# Patient Record
Sex: MALE | Race: WHITE | NOT HISPANIC OR LATINO | Employment: FULL TIME | URBAN - METROPOLITAN AREA
[De-identification: names, ages, dates, MRNs, and addresses within clinical notes are randomized per-mention and may not be internally consistent; named-entity substitution may affect disease eponyms.]

---

## 2023-09-29 LAB
ALANINE AMINOTRANSFERASE (SGPT) (U/L) IN SER/PLAS: 13 U/L (ref 10–52)
ALBUMIN (G/DL) IN SER/PLAS: 4.5 G/DL (ref 3.4–5)
ALKALINE PHOSPHATASE (U/L) IN SER/PLAS: 69 U/L (ref 33–120)
ANION GAP IN SER/PLAS: 15 MMOL/L (ref 10–20)
ASPARTATE AMINOTRANSFERASE (SGOT) (U/L) IN SER/PLAS: 11 U/L (ref 9–39)
BASOPHILS (10*3/UL) IN BLOOD BY AUTOMATED COUNT: 0.05 X10E9/L (ref 0–0.1)
BASOPHILS/100 LEUKOCYTES IN BLOOD BY AUTOMATED COUNT: 0.6 % (ref 0–2)
BILIRUBIN TOTAL (MG/DL) IN SER/PLAS: 0.6 MG/DL (ref 0–1.2)
CALCIUM (MG/DL) IN SER/PLAS: 9.6 MG/DL (ref 8.6–10.6)
CARBON DIOXIDE, TOTAL (MMOL/L) IN SER/PLAS: 24 MMOL/L (ref 21–32)
CHLORIDE (MMOL/L) IN SER/PLAS: 106 MMOL/L (ref 98–107)
CHOLESTEROL (MG/DL) IN SER/PLAS: 180 MG/DL (ref 0–199)
CHOLESTEROL IN HDL (MG/DL) IN SER/PLAS: 35.6 MG/DL
CHOLESTEROL/HDL RATIO: 5.1
CREATININE (MG/DL) IN SER/PLAS: 0.77 MG/DL (ref 0.5–1.3)
EOSINOPHILS (10*3/UL) IN BLOOD BY AUTOMATED COUNT: 0.18 X10E9/L (ref 0–0.7)
EOSINOPHILS/100 LEUKOCYTES IN BLOOD BY AUTOMATED COUNT: 2.3 % (ref 0–6)
ERYTHROCYTE DISTRIBUTION WIDTH (RATIO) BY AUTOMATED COUNT: 12.8 % (ref 11.5–14.5)
ERYTHROCYTE MEAN CORPUSCULAR HEMOGLOBIN CONCENTRATION (G/DL) BY AUTOMATED: 34.5 G/DL (ref 32–36)
ERYTHROCYTE MEAN CORPUSCULAR VOLUME (FL) BY AUTOMATED COUNT: 91 FL (ref 80–100)
ERYTHROCYTES (10*6/UL) IN BLOOD BY AUTOMATED COUNT: 5.21 X10E12/L (ref 4.5–5.9)
GFR MALE: >90 ML/MIN/1.73M2
GLUCOSE (MG/DL) IN SER/PLAS: 81 MG/DL (ref 74–99)
HEMATOCRIT (%) IN BLOOD BY AUTOMATED COUNT: 47.5 % (ref 41–52)
HEMOGLOBIN (G/DL) IN BLOOD: 16.4 G/DL (ref 13.5–17.5)
IMMATURE GRANULOCYTES/100 LEUKOCYTES IN BLOOD BY AUTOMATED COUNT: 0.4 % (ref 0–0.9)
LDL: 120 MG/DL (ref 0–99)
LEUKOCYTES (10*3/UL) IN BLOOD BY AUTOMATED COUNT: 8 X10E9/L (ref 4.4–11.3)
LYMPHOCYTES (10*3/UL) IN BLOOD BY AUTOMATED COUNT: 2.1 X10E9/L (ref 1.2–4.8)
LYMPHOCYTES/100 LEUKOCYTES IN BLOOD BY AUTOMATED COUNT: 26.3 % (ref 13–44)
MONOCYTES (10*3/UL) IN BLOOD BY AUTOMATED COUNT: 0.57 X10E9/L (ref 0.1–1)
MONOCYTES/100 LEUKOCYTES IN BLOOD BY AUTOMATED COUNT: 7.2 % (ref 2–10)
NEUTROPHILS (10*3/UL) IN BLOOD BY AUTOMATED COUNT: 5.04 X10E9/L (ref 1.2–7.7)
NEUTROPHILS/100 LEUKOCYTES IN BLOOD BY AUTOMATED COUNT: 63.2 % (ref 40–80)
NRBC (PER 100 WBCS) BY AUTOMATED COUNT: 0 /100 WBC (ref 0–0)
PLATELETS (10*3/UL) IN BLOOD AUTOMATED COUNT: 279 X10E9/L (ref 150–450)
POTASSIUM (MMOL/L) IN SER/PLAS: 4.2 MMOL/L (ref 3.5–5.3)
PROSTATE SPECIFIC AG (NG/ML) IN SER/PLAS: 0.7 NG/ML (ref 0–4)
PROTEIN TOTAL: 7.2 G/DL (ref 6.4–8.2)
SODIUM (MMOL/L) IN SER/PLAS: 141 MMOL/L (ref 136–145)
THYROTROPIN (MIU/L) IN SER/PLAS BY DETECTION LIMIT <= 0.05 MIU/L: 0.68 MIU/L (ref 0.44–3.98)
THYROXINE (T4) FREE (NG/DL) IN SER/PLAS: 0.94 NG/DL (ref 0.78–1.48)
TRIGLYCERIDE (MG/DL) IN SER/PLAS: 121 MG/DL (ref 0–149)
UREA NITROGEN (MG/DL) IN SER/PLAS: 17 MG/DL (ref 6–23)
VLDL: 24 MG/DL (ref 0–40)

## 2023-11-01 DIAGNOSIS — E29.1 HYPOGONADISM IN MALE: ICD-10-CM

## 2023-11-01 DIAGNOSIS — I10 PRIMARY HYPERTENSION: Primary | ICD-10-CM

## 2023-11-06 DIAGNOSIS — I10 PRIMARY HYPERTENSION: ICD-10-CM

## 2023-11-06 DIAGNOSIS — E29.1 HYPOGONADISM IN MALE: ICD-10-CM

## 2023-11-06 RX ORDER — LISINOPRIL 20 MG/1
20 TABLET ORAL DAILY
Qty: 30 TABLET | Refills: 2 | Status: SHIPPED | OUTPATIENT
Start: 2023-11-06 | End: 2024-02-15 | Stop reason: SDUPTHER

## 2023-11-06 RX ORDER — LISINOPRIL 20 MG/1
20 TABLET ORAL DAILY
Qty: 30 TABLET | Refills: 5 | Status: SHIPPED | OUTPATIENT
Start: 2023-11-06 | End: 2023-12-14 | Stop reason: SDUPTHER

## 2023-11-13 ENCOUNTER — LAB (OUTPATIENT)
Dept: LAB | Facility: LAB | Age: 48
End: 2023-11-13
Payer: COMMERCIAL

## 2023-11-13 DIAGNOSIS — E29.1 HYPOGONADISM IN MALE: ICD-10-CM

## 2023-11-13 PROCEDURE — 84402 ASSAY OF FREE TESTOSTERONE: CPT

## 2023-11-13 PROCEDURE — 36415 COLL VENOUS BLD VENIPUNCTURE: CPT

## 2023-11-19 LAB
TESTOSTERONE FREE (CHAN): 68.5 PG/ML (ref 35–155)
TESTOSTERONE,TOTAL,LC-MS/MS: 303 NG/DL (ref 250–1100)

## 2023-12-14 ENCOUNTER — OFFICE VISIT (OUTPATIENT)
Dept: PRIMARY CARE | Facility: CLINIC | Age: 48
End: 2023-12-14
Payer: COMMERCIAL

## 2023-12-14 VITALS
DIASTOLIC BLOOD PRESSURE: 70 MMHG | HEART RATE: 72 BPM | WEIGHT: 225 LBS | SYSTOLIC BLOOD PRESSURE: 120 MMHG | HEIGHT: 71 IN | RESPIRATION RATE: 16 BRPM | BODY MASS INDEX: 31.5 KG/M2

## 2023-12-14 DIAGNOSIS — E29.1 HYPOGONADISM IN MALE: ICD-10-CM

## 2023-12-14 DIAGNOSIS — K21.9 GASTROESOPHAGEAL REFLUX DISEASE WITHOUT ESOPHAGITIS: ICD-10-CM

## 2023-12-14 DIAGNOSIS — I10 PRIMARY HYPERTENSION: Primary | ICD-10-CM

## 2023-12-14 PROBLEM — E66.9 OBESITY (BMI 30.0-34.9): Status: ACTIVE | Noted: 2023-12-14

## 2023-12-14 PROBLEM — D22.70 MELANOCYTIC NEVI OF UNSPECIFIED LOWER LIMB, INCLUDING HIP: Status: ACTIVE | Noted: 2019-08-08

## 2023-12-14 PROBLEM — L57.9 SKIN CHANGES DUE TO CHRONIC EXPOSURE TO NONIONIZING RADIATION, UNSPECIFIED: Status: ACTIVE | Noted: 2019-08-08

## 2023-12-14 PROBLEM — D22.60 MELANOCYTIC NEVI OF UNSPECIFIED UPPER LIMB, INCLUDING SHOULDER: Status: ACTIVE | Noted: 2019-08-08

## 2023-12-14 PROBLEM — D22.5 MELANOCYTIC NEVI OF TRUNK: Status: ACTIVE | Noted: 2019-08-08

## 2023-12-14 PROBLEM — L82.0 INFLAMED SEBORRHEIC KERATOSIS: Status: ACTIVE | Noted: 2019-08-08

## 2023-12-14 PROBLEM — R03.0 ELEVATED BP WITHOUT DIAGNOSIS OF HYPERTENSION: Status: ACTIVE | Noted: 2023-12-14

## 2023-12-14 PROBLEM — B02.29 POST HERPETIC NEURALGIA: Status: ACTIVE | Noted: 2023-12-14

## 2023-12-14 PROBLEM — L57.0 ACTINIC KERATOSIS: Status: ACTIVE | Noted: 2019-08-08

## 2023-12-14 PROBLEM — L82.1 OTHER SEBORRHEIC KERATOSIS: Status: ACTIVE | Noted: 2019-08-08

## 2023-12-14 PROBLEM — D22.39 MELANOCYTIC NEVI OF OTHER PARTS OF FACE: Status: ACTIVE | Noted: 2019-08-08

## 2023-12-14 PROBLEM — E66.811 OBESITY (BMI 30.0-34.9): Status: ACTIVE | Noted: 2023-12-14

## 2023-12-14 PROCEDURE — 3078F DIAST BP <80 MM HG: CPT | Performed by: INTERNAL MEDICINE

## 2023-12-14 PROCEDURE — 4004F PT TOBACCO SCREEN RCVD TLK: CPT | Performed by: INTERNAL MEDICINE

## 2023-12-14 PROCEDURE — 99213 OFFICE O/P EST LOW 20 MIN: CPT | Performed by: INTERNAL MEDICINE

## 2023-12-14 PROCEDURE — 3074F SYST BP LT 130 MM HG: CPT | Performed by: INTERNAL MEDICINE

## 2023-12-14 RX ORDER — OMEPRAZOLE 40 MG/1
40 CAPSULE, DELAYED RELEASE ORAL
COMMUNITY
Start: 2019-02-06 | End: 2024-01-26 | Stop reason: HOSPADM

## 2023-12-14 ASSESSMENT — ENCOUNTER SYMPTOMS
CONSTITUTIONAL NEGATIVE: 1
EYES NEGATIVE: 1
GASTROINTESTINAL NEGATIVE: 1
MUSCULOSKELETAL NEGATIVE: 1
NEUROLOGICAL NEGATIVE: 1
ALLERGIC/IMMUNOLOGIC NEGATIVE: 1
RESPIRATORY NEGATIVE: 1
ENDOCRINE NEGATIVE: 1
PSYCHIATRIC NEGATIVE: 1
HEMATOLOGIC/LYMPHATIC NEGATIVE: 1
CARDIOVASCULAR NEGATIVE: 1

## 2023-12-14 NOTE — ASSESSMENT & PLAN NOTE
HTN - hypertension well/controlled .Target BP < 130/80  achieved. Educate low salt diet and exercise with weight loss. Educate home self monitoring and diary keeping. Educate risks of elevate blood pressure and benefits of prompt treatment.  Refill lisinopril

## 2023-12-14 NOTE — PROGRESS NOTES
"Subjective   Patient ID: Mele Kruger is a 48 y.o. male who presents for Follow-up (2 month follow up).    HPI     Review of Systems   Constitutional: Negative.    HENT: Negative.     Eyes: Negative.    Respiratory: Negative.     Cardiovascular: Negative.    Gastrointestinal: Negative.    Endocrine: Negative.    Musculoskeletal: Negative.    Skin: Negative.    Allergic/Immunologic: Negative.    Neurological: Negative.    Hematological: Negative.    Psychiatric/Behavioral: Negative.     All other systems reviewed and are negative.      Objective   Ht 1.803 m (5' 11\")   Wt 102 kg (225 lb)   BMI 31.38 kg/m²   Blood pressure 120/70, pulse 72, resp. rate 16, height 1.803 m (5' 11\"), weight 102 kg (225 lb).   Physical Exam  Vitals and nursing note reviewed.   Constitutional:       Appearance: Normal appearance.   HENT:      Head: Normocephalic and atraumatic.      Right Ear: Tympanic membrane, ear canal and external ear normal.      Left Ear: Tympanic membrane, ear canal and external ear normal. There is no impacted cerumen.      Nose: Nose normal.      Mouth/Throat:      Mouth: Mucous membranes are moist.      Pharynx: Oropharynx is clear.   Eyes:      Extraocular Movements: Extraocular movements intact.      Conjunctiva/sclera: Conjunctivae normal.      Pupils: Pupils are equal, round, and reactive to light.   Cardiovascular:      Rate and Rhythm: Normal rate and regular rhythm.      Pulses: Normal pulses.      Heart sounds: Normal heart sounds. No murmur heard.  Pulmonary:      Effort: Pulmonary effort is normal. No respiratory distress.      Breath sounds: Normal breath sounds. No stridor. No wheezing, rhonchi or rales.   Chest:      Chest wall: No tenderness.   Abdominal:      General: Abdomen is flat. Bowel sounds are normal. There is no distension.      Palpations: Abdomen is soft. There is no mass.      Tenderness: There is no abdominal tenderness. There is no right CVA tenderness, left CVA tenderness, guarding or " rebound.      Hernia: No hernia is present.   Musculoskeletal:         General: Normal range of motion.      Cervical back: Normal range of motion and neck supple.   Skin:     General: Skin is warm.      Capillary Refill: Capillary refill takes less than 2 seconds.   Neurological:      General: No focal deficit present.      Mental Status: He is alert.      Cranial Nerves: No cranial nerve deficit.      Sensory: No sensory deficit.      Motor: No weakness.      Coordination: Coordination normal.      Gait: Gait normal.      Deep Tendon Reflexes: Reflexes normal.   Psychiatric:         Mood and Affect: Mood normal.         Behavior: Behavior normal. Behavior is cooperative.         Thought Content: Thought content normal.         Judgment: Judgment normal.         Assessment/Plan   Problem List Items Addressed This Visit             ICD-10-CM    Hypogonadism in male E29.1     Monitor levles         Primary hypertension - Primary I10     HTN - hypertension well/controlled .Target BP < 130/80  achieved. Educate low salt diet and exercise with weight loss. Educate home self monitoring and diary keeping. Educate risks of elevate blood pressure and benefits of prompt treatment.  Refill lisinopril         GERD (gastroesophageal reflux disease) K21.9     GERD - Acid reflux disease. Rx. PPI (Prilosec/Prevacid/Protonix/Nexium) and educate diet and life style changes. Referred patient to an endoscopy (EGD) and check H. Pylori titers.             HTN - hypertension well/controlled.Target BP < 130/80 well/not achieved. Educate low salt diet and exercise with weight loss. Educate home self monitoring and diary keeping. Educate risks of elevate blood pressure and benefits of prompt treatment..start Lisinopril/Hct 10/12.5 mg daily      Dizziness - monitor blood pressure      Fatigue - check CMP(metabolic panel and elctrolytes) , CBC(complete blood cell count), TSH(thyroid function). Insomnia may play a role and sleep studies(rule  out sleep apnea) are recommended. Educate sleep hygiene. Consider anxiety disorder vs. depression. Consider Stress test, and 2DECHO.      GERD - Continues the proton pump inhibitors in (PPI) in the form of /Pantoprazole 40 mg daily and educate change in life style and educate diet - Educate extensively diet and elevate the head of the bed at night - at HS = refer to GI for EGD - last upper endoscopy ??? refer back to GI for upper endoscopy -

## 2024-01-25 ENCOUNTER — APPOINTMENT (OUTPATIENT)
Dept: CARDIOLOGY | Facility: HOSPITAL | Age: 49
End: 2024-01-25
Payer: COMMERCIAL

## 2024-01-25 ENCOUNTER — APPOINTMENT (OUTPATIENT)
Dept: RADIOLOGY | Facility: HOSPITAL | Age: 49
End: 2024-01-25
Payer: COMMERCIAL

## 2024-01-25 ENCOUNTER — HOSPITAL ENCOUNTER (OUTPATIENT)
Facility: HOSPITAL | Age: 49
Setting detail: OBSERVATION
Discharge: HOME | End: 2024-01-26
Attending: EMERGENCY MEDICINE | Admitting: INTERNAL MEDICINE
Payer: COMMERCIAL

## 2024-01-25 DIAGNOSIS — I61.9 CVA (CEREBROVASCULAR ACCIDENT DUE TO INTRACEREBRAL HEMORRHAGE) (MULTI): Primary | ICD-10-CM

## 2024-01-25 DIAGNOSIS — I10 PRIMARY HYPERTENSION: ICD-10-CM

## 2024-01-25 DIAGNOSIS — J18.9 PNEUMONIA DUE TO INFECTIOUS ORGANISM, UNSPECIFIED LATERALITY, UNSPECIFIED PART OF LUNG: ICD-10-CM

## 2024-01-25 DIAGNOSIS — R42 DIZZINESS: ICD-10-CM

## 2024-01-25 DIAGNOSIS — R79.89 ABNORMAL TSH: ICD-10-CM

## 2024-01-25 DIAGNOSIS — R42 VERTIGO: ICD-10-CM

## 2024-01-25 DIAGNOSIS — K21.9 GASTROESOPHAGEAL REFLUX DISEASE WITHOUT ESOPHAGITIS: ICD-10-CM

## 2024-01-25 DIAGNOSIS — R55 SYNCOPE, UNSPECIFIED SYNCOPE TYPE: ICD-10-CM

## 2024-01-25 PROBLEM — R10.9 ABDOMINAL PAIN: Status: ACTIVE | Noted: 2024-01-25

## 2024-01-25 LAB
ALBUMIN SERPL BCP-MCNC: 4 G/DL (ref 3.4–5)
ALP SERPL-CCNC: 56 U/L (ref 33–120)
ALT SERPL W P-5'-P-CCNC: 15 U/L (ref 10–52)
ANION GAP SERPL CALC-SCNC: 14 MMOL/L (ref 10–20)
AST SERPL W P-5'-P-CCNC: 10 U/L (ref 9–39)
BASOPHILS # BLD AUTO: 0.04 X10*3/UL (ref 0–0.1)
BASOPHILS NFR BLD AUTO: 0.3 %
BILIRUB SERPL-MCNC: 0.5 MG/DL (ref 0–1.2)
BUN SERPL-MCNC: 17 MG/DL (ref 6–23)
CALCIUM SERPL-MCNC: 9.2 MG/DL (ref 8.6–10.3)
CARDIAC TROPONIN I PNL SERPL HS: <3 NG/L (ref 0–20)
CHLORIDE SERPL-SCNC: 102 MMOL/L (ref 98–107)
CO2 SERPL-SCNC: 25 MMOL/L (ref 21–32)
CREAT SERPL-MCNC: 0.88 MG/DL (ref 0.5–1.3)
EGFRCR SERPLBLD CKD-EPI 2021: >90 ML/MIN/1.73M*2
EOSINOPHIL # BLD AUTO: 0.08 X10*3/UL (ref 0–0.7)
EOSINOPHIL NFR BLD AUTO: 0.6 %
ERYTHROCYTE [DISTWIDTH] IN BLOOD BY AUTOMATED COUNT: 12.9 % (ref 11.5–14.5)
FLUAV RNA RESP QL NAA+PROBE: NOT DETECTED
FLUBV RNA RESP QL NAA+PROBE: NOT DETECTED
GLUCOSE SERPL-MCNC: 123 MG/DL (ref 74–99)
HCT VFR BLD AUTO: 46.6 % (ref 41–52)
HGB BLD-MCNC: 15.9 G/DL (ref 13.5–17.5)
IMM GRANULOCYTES # BLD AUTO: 0.06 X10*3/UL (ref 0–0.7)
IMM GRANULOCYTES NFR BLD AUTO: 0.4 % (ref 0–0.9)
LYMPHOCYTES # BLD AUTO: 1.59 X10*3/UL (ref 1.2–4.8)
LYMPHOCYTES NFR BLD AUTO: 11.4 %
MCH RBC QN AUTO: 30.6 PG (ref 26–34)
MCHC RBC AUTO-ENTMCNC: 34.1 G/DL (ref 32–36)
MCV RBC AUTO: 90 FL (ref 80–100)
MONOCYTES # BLD AUTO: 0.72 X10*3/UL (ref 0.1–1)
MONOCYTES NFR BLD AUTO: 5.2 %
NEUTROPHILS # BLD AUTO: 11.41 X10*3/UL (ref 1.2–7.7)
NEUTROPHILS NFR BLD AUTO: 82.1 %
NRBC BLD-RTO: 0 /100 WBCS (ref 0–0)
PLATELET # BLD AUTO: 328 X10*3/UL (ref 150–450)
POTASSIUM SERPL-SCNC: 4 MMOL/L (ref 3.5–5.3)
PROT SERPL-MCNC: 7 G/DL (ref 6.4–8.2)
RBC # BLD AUTO: 5.2 X10*6/UL (ref 4.5–5.9)
SARS-COV-2 RNA RESP QL NAA+PROBE: NOT DETECTED
SODIUM SERPL-SCNC: 137 MMOL/L (ref 136–145)
WBC # BLD AUTO: 13.9 X10*3/UL (ref 4.4–11.3)

## 2024-01-25 PROCEDURE — 96366 THER/PROPH/DIAG IV INF ADDON: CPT

## 2024-01-25 PROCEDURE — 2500000004 HC RX 250 GENERAL PHARMACY W/ HCPCS (ALT 636 FOR OP/ED): Performed by: NURSE PRACTITIONER

## 2024-01-25 PROCEDURE — 99285 EMERGENCY DEPT VISIT HI MDM: CPT | Performed by: EMERGENCY MEDICINE

## 2024-01-25 PROCEDURE — G0378 HOSPITAL OBSERVATION PER HR: HCPCS

## 2024-01-25 PROCEDURE — 96365 THER/PROPH/DIAG IV INF INIT: CPT

## 2024-01-25 PROCEDURE — 96361 HYDRATE IV INFUSION ADD-ON: CPT

## 2024-01-25 PROCEDURE — 70450 CT HEAD/BRAIN W/O DYE: CPT

## 2024-01-25 PROCEDURE — 85025 COMPLETE CBC W/AUTO DIFF WBC: CPT | Performed by: EMERGENCY MEDICINE

## 2024-01-25 PROCEDURE — 99223 1ST HOSP IP/OBS HIGH 75: CPT | Performed by: NURSE PRACTITIONER

## 2024-01-25 PROCEDURE — 36415 COLL VENOUS BLD VENIPUNCTURE: CPT | Performed by: EMERGENCY MEDICINE

## 2024-01-25 PROCEDURE — 96375 TX/PRO/DX INJ NEW DRUG ADDON: CPT

## 2024-01-25 PROCEDURE — 96367 TX/PROPH/DG ADDL SEQ IV INF: CPT

## 2024-01-25 PROCEDURE — 87636 SARSCOV2 & INF A&B AMP PRB: CPT | Performed by: EMERGENCY MEDICINE

## 2024-01-25 PROCEDURE — 93005 ELECTROCARDIOGRAM TRACING: CPT

## 2024-01-25 PROCEDURE — 71045 X-RAY EXAM CHEST 1 VIEW: CPT

## 2024-01-25 PROCEDURE — 2500000001 HC RX 250 WO HCPCS SELF ADMINISTERED DRUGS (ALT 637 FOR MEDICARE OP): Performed by: EMERGENCY MEDICINE

## 2024-01-25 PROCEDURE — 2500000004 HC RX 250 GENERAL PHARMACY W/ HCPCS (ALT 636 FOR OP/ED): Performed by: EMERGENCY MEDICINE

## 2024-01-25 PROCEDURE — 84484 ASSAY OF TROPONIN QUANT: CPT | Performed by: EMERGENCY MEDICINE

## 2024-01-25 PROCEDURE — 80053 COMPREHEN METABOLIC PANEL: CPT | Performed by: EMERGENCY MEDICINE

## 2024-01-25 PROCEDURE — 71045 X-RAY EXAM CHEST 1 VIEW: CPT | Performed by: RADIOLOGY

## 2024-01-25 PROCEDURE — 70450 CT HEAD/BRAIN W/O DYE: CPT | Performed by: RADIOLOGY

## 2024-01-25 RX ORDER — ONDANSETRON HYDROCHLORIDE 2 MG/ML
4 INJECTION, SOLUTION INTRAVENOUS ONCE
Status: COMPLETED | OUTPATIENT
Start: 2024-01-25 | End: 2024-01-25

## 2024-01-25 RX ORDER — SODIUM CHLORIDE 9 MG/ML
75 INJECTION, SOLUTION INTRAVENOUS CONTINUOUS
Status: DISCONTINUED | OUTPATIENT
Start: 2024-01-25 | End: 2024-01-26 | Stop reason: HOSPADM

## 2024-01-25 RX ORDER — PANTOPRAZOLE SODIUM 40 MG/1
40 TABLET, DELAYED RELEASE ORAL
Status: DISCONTINUED | OUTPATIENT
Start: 2024-01-26 | End: 2024-01-26 | Stop reason: HOSPADM

## 2024-01-25 RX ORDER — LISINOPRIL 10 MG/1
20 TABLET ORAL DAILY
Status: DISCONTINUED | OUTPATIENT
Start: 2024-01-26 | End: 2024-01-26

## 2024-01-25 RX ORDER — MECLIZINE HYDROCHLORIDE 25 MG/1
25 TABLET ORAL ONCE
Status: COMPLETED | OUTPATIENT
Start: 2024-01-25 | End: 2024-01-25

## 2024-01-25 RX ORDER — CEFTRIAXONE 1 G/50ML
1 INJECTION, SOLUTION INTRAVENOUS EVERY 24 HOURS
Status: DISCONTINUED | OUTPATIENT
Start: 2024-01-26 | End: 2024-01-26 | Stop reason: HOSPADM

## 2024-01-25 RX ORDER — CEFTRIAXONE 1 G/50ML
1 INJECTION, SOLUTION INTRAVENOUS ONCE
Status: COMPLETED | OUTPATIENT
Start: 2024-01-25 | End: 2024-01-25

## 2024-01-25 RX ADMIN — MECLIZINE HYDROCHLORIDE 25 MG: 25 TABLET ORAL at 17:22

## 2024-01-25 RX ADMIN — AZITHROMYCIN MONOHYDRATE 500 MG: 500 INJECTION, POWDER, LYOPHILIZED, FOR SOLUTION INTRAVENOUS at 20:26

## 2024-01-25 RX ADMIN — SODIUM CHLORIDE, POTASSIUM CHLORIDE, SODIUM LACTATE AND CALCIUM CHLORIDE 1000 ML: 600; 310; 30; 20 INJECTION, SOLUTION INTRAVENOUS at 17:22

## 2024-01-25 RX ADMIN — CEFTRIAXONE SODIUM 1 G: 1 INJECTION, SOLUTION INTRAVENOUS at 20:03

## 2024-01-25 RX ADMIN — ONDANSETRON 4 MG: 2 INJECTION INTRAMUSCULAR; INTRAVENOUS at 17:21

## 2024-01-25 RX ADMIN — SODIUM CHLORIDE 75 ML/HR: 9 INJECTION, SOLUTION INTRAVENOUS at 23:02

## 2024-01-25 RX ADMIN — MECLIZINE HYDROCHLORIDE 25 MG: 25 TABLET ORAL at 23:04

## 2024-01-25 ASSESSMENT — PAIN SCALES - GENERAL
PAINLEVEL_OUTOF10: 0 - NO PAIN

## 2024-01-25 ASSESSMENT — COLUMBIA-SUICIDE SEVERITY RATING SCALE - C-SSRS
6. HAVE YOU EVER DONE ANYTHING, STARTED TO DO ANYTHING, OR PREPARED TO DO ANYTHING TO END YOUR LIFE?: NO
2. HAVE YOU ACTUALLY HAD ANY THOUGHTS OF KILLING YOURSELF?: NO
1. IN THE PAST MONTH, HAVE YOU WISHED YOU WERE DEAD OR WISHED YOU COULD GO TO SLEEP AND NOT WAKE UP?: NO

## 2024-01-25 ASSESSMENT — PAIN - FUNCTIONAL ASSESSMENT: PAIN_FUNCTIONAL_ASSESSMENT: 0-10

## 2024-01-25 NOTE — ED PROVIDER NOTES
History/Exam limitations: none.   Additional history was obtained from patient and spouse/SO.          HPI:    Tyrel Kruger is a 48 y.o. male PMH hypertension, GERD, smoking presenting with acute on subacute dizziness.  Patient states he was at work and got up and walked and felt sudden onset room spinning dizziness.  No headache or neck pain.  No focal weakness numbness.  No discoordination.  States that he sat down he continued to feel dizzy.  States that he began having nausea vomiting and sweatiness.  Denies any chest pain.  No palpitation.  No shortness of breath.  States he has had small episodes that have self resolved however no significant episode such as this.  Denies any history of recent focal weakness or numbness.  He is on medication for blood pressure thought to potentially episodes were related to this.  Has been having similar episodes recently however less intense.          Physical Exam:  ED Triage Vitals   Temperature Heart Rate Respirations BP   01/25/24 1645 01/25/24 1645 01/25/24 1645 01/25/24 1645   37 °C (98.6 °F) 77 16 (!) 147/91      Pulse Ox Temp src Heart Rate Source Patient Position   01/25/24 1645 -- 01/25/24 1650 01/25/24 1650   98 %  Monitor Sitting      BP Location FiO2 (%)     01/25/24 1645 --     Left arm         GEN:      Alert, NAD  Eyes:       PERRL, EOMI  HENT:      NC/AT, OP clear, airway patent, MM  CV:      RRR, no MRG, no LE pitting edema, 2+ radial and pedal pulses  PULM:     Scant crackles in left lower midlung field, easy WOB, symmetric chest rise  ABD:      Soft, NT, ND, no masses, BS +  :       No CVA TTP  NEURO:   A/Ox4, CN II-XII intact, strength 5/5 in all 4 ext, SILT, FNF normal b/l, heel shin normal bilaterally, no pronator drift, right beating nystagmus on right gaze, corrective saccade with head impulse, negative test of skew, no truncal instability, no dysarthria  MSK:      FROM, no joint deformities or swelling, no e/o trauma  SKIN:       Warm and  dry  PSYCH:    Appropriate mood and affect         MDM/ED Course:   Tyrel Kruger is a 48 y.o. male PMH hypertension, GERD, smoking presenting with acute on subacute dizziness.  Vitals markable for blood pressure 147/91, as documented above.  Exam as documented above.  Differential for dizziness is broad and includes intracranial hemorrhage, other acute intracranial pathology, posterior CVA, vascular insufficiency, BPPV, mass, other inner ear dysfunction.  Patient's hints exam as above is concerning for a peripheral cause.  No lateralizing deficits on exam which is reassuring.  Patient has been having episodes of dizziness off and on in the recent past, this 1 was more severe than the prior episodes.  Differential includes underlying infectious process given overall generalized fatigue.  Differential clues underlying arrhythmia.  EKG as below.  IV placed and labs drawn.  Patient was given IV Zofran, IV fluids and dose of p.o. meclizine.  Patient tolerated p.o. and dizziness markedly improved after meclizine.  No further nystagmus able to be elicited on reassessment states he feels slightly lightheaded but significantly improved.  CBC with leukocytosis to 13.9 with left shift.  Hemoglobin reassuring.  Chemistry reassuring.  Troponin negative.  COVID influenza negative.  CT head was obtained and so some trace mucosal thickening of the left maxillary sinus, no other acute intracranial pathology.  Considered angio imaging of the head and neck however patient has no neck pain or posterior headache, as above exam consistent with peripheral process.  Chest x-ray obtained and displays a mid left base infiltrate and suggestive of a lung nodule.  Patient's O2 sat was dropping into the 80s while sleeping and consistently in the low 90s.  Patient did have scant crackles in this area on exam so concern for underlying pneumonia.  Could explain his generalized fatigue.  Patient was covered with IV ceftriaxone and azithromycin.   Given borderline oxygen saturations, continuing feeling of lightheadedness, patient hospitalized for continued management, observation and workup.    EKG reviewed by me: 5:31 PM normal sinus rhythm, rate 91, normal axis,  ms, QTc 482 ms, no STEMI, no ectopy, no prior for comparison, no signs of Brugada/WPW/HOCM/epsilon wave.     ED Course as of 01/27/24 1948   Thu Jan 25, 2024   1706 R beating nystagmus, corrective saccade on head impulse, neg test of skew, no truncal instability [JM]   1848 Had one further episode of room spinning dizziness, now resolved. On reassessment, no further nystagmus. Tms clear, no hearing changes or tinnitus.  []   2009 94% on RA, mid upper 80s while sleeping [JM]      ED Course User Index  [JM] Luis Lam MD         Diagnoses as of 01/27/24 1948   CVA (cerebrovascular accident due to intracerebral hemorrhage) (CMS/[facility])   Pneumonia due to infectious organism, unspecified laterality, unspecified part of lung   Dizziness         Chronic medical conditions affecting care listed in MDM. I independently reviewed imaging studies and agreed with radiology reads. I reviewed recent and relevant outside records including PCP notes, Prior discharge summaries, and prior radiology reports.    Procedure  Procedures    Diagnosis:   1.  Pneumonia  2.  Hypoxia  3.  Dizziness    Dispo: Hospitalized in stable condition      Disclaimer: Portions of this note were dictated by speech recognition. An attempt at proof reading was made to minimize errors. Minor errors in transcription may be present.  Please call if questions.     Luis Lam MD  01/27/24 1948

## 2024-01-26 ENCOUNTER — APPOINTMENT (OUTPATIENT)
Dept: CARDIOLOGY | Facility: HOSPITAL | Age: 49
End: 2024-01-26
Payer: COMMERCIAL

## 2024-01-26 ENCOUNTER — APPOINTMENT (OUTPATIENT)
Dept: RADIOLOGY | Facility: HOSPITAL | Age: 49
End: 2024-01-26
Payer: COMMERCIAL

## 2024-01-26 VITALS
HEIGHT: 67 IN | DIASTOLIC BLOOD PRESSURE: 82 MMHG | RESPIRATION RATE: 17 BRPM | SYSTOLIC BLOOD PRESSURE: 146 MMHG | OXYGEN SATURATION: 94 % | WEIGHT: 235 LBS | BODY MASS INDEX: 36.88 KG/M2 | TEMPERATURE: 98.4 F | HEART RATE: 77 BPM

## 2024-01-26 PROBLEM — I61.9 CVA (CEREBROVASCULAR ACCIDENT DUE TO INTRACEREBRAL HEMORRHAGE) (MULTI): Status: ACTIVE | Noted: 2024-01-26

## 2024-01-26 LAB
ANION GAP SERPL CALC-SCNC: 12 MMOL/L (ref 10–20)
AORTIC VALVE MEAN GRADIENT: 5 MMHG
AORTIC VALVE PEAK VELOCITY: 1.46 M/S
ATRIAL RATE: 91 BPM
AV PEAK GRADIENT: 8.5 MMHG
AVA (PEAK VEL): 3.84 CM2
AVA (VTI): 3.88 CM2
BUN SERPL-MCNC: 13 MG/DL (ref 6–23)
CALCIUM SERPL-MCNC: 8.5 MG/DL (ref 8.6–10.3)
CHLORIDE SERPL-SCNC: 106 MMOL/L (ref 98–107)
CHOLEST SERPL-MCNC: 137 MG/DL (ref 0–199)
CHOLESTEROL/HDL RATIO: 4.3
CO2 SERPL-SCNC: 25 MMOL/L (ref 21–32)
CREAT SERPL-MCNC: 0.68 MG/DL (ref 0.5–1.3)
EGFRCR SERPLBLD CKD-EPI 2021: >90 ML/MIN/1.73M*2
EJECTION FRACTION APICAL 4 CHAMBER: 60.1
EJECTION FRACTION: 59 %
ERYTHROCYTE [DISTWIDTH] IN BLOOD BY AUTOMATED COUNT: 12.8 % (ref 11.5–14.5)
ERYTHROCYTE [DISTWIDTH] IN BLOOD BY AUTOMATED COUNT: 13 % (ref 11.5–14.5)
EST. AVERAGE GLUCOSE BLD GHB EST-MCNC: 105 MG/DL
GLUCOSE SERPL-MCNC: 103 MG/DL (ref 74–99)
HBA1C MFR BLD: 5.3 %
HCT VFR BLD AUTO: 41.5 % (ref 41–52)
HCT VFR BLD AUTO: 42.9 % (ref 41–52)
HDLC SERPL-MCNC: 31.5 MG/DL
HGB BLD-MCNC: 14.2 G/DL (ref 13.5–17.5)
HGB BLD-MCNC: 14.5 G/DL (ref 13.5–17.5)
HOLD SPECIMEN: NORMAL
LDLC SERPL CALC-MCNC: 85 MG/DL
LEFT ATRIUM VOLUME AREA LENGTH INDEX BSA: 23.1 ML/M2
LEFT VENTRICLE INTERNAL DIMENSION DIASTOLE: 5.1 CM (ref 3.5–6)
LEFT VENTRICULAR OUTFLOW TRACT DIAMETER: 2.3 CM
MCH RBC QN AUTO: 30.7 PG (ref 26–34)
MCH RBC QN AUTO: 30.7 PG (ref 26–34)
MCHC RBC AUTO-ENTMCNC: 33.8 G/DL (ref 32–36)
MCHC RBC AUTO-ENTMCNC: 34.2 G/DL (ref 32–36)
MCV RBC AUTO: 90 FL (ref 80–100)
MCV RBC AUTO: 91 FL (ref 80–100)
MITRAL VALVE E/A RATIO: 1.33
MITRAL VALVE E/E' RATIO: 7.51
NON HDL CHOLESTEROL: 106 MG/DL (ref 0–149)
NRBC BLD-RTO: 0 /100 WBCS (ref 0–0)
NRBC BLD-RTO: 0 /100 WBCS (ref 0–0)
P AXIS: 44 DEGREES
P OFFSET: 197 MS
P ONSET: 149 MS
PLATELET # BLD AUTO: 255 X10*3/UL (ref 150–450)
PLATELET # BLD AUTO: 258 X10*3/UL (ref 150–450)
POTASSIUM SERPL-SCNC: 3.9 MMOL/L (ref 3.5–5.3)
PR INTERVAL: 142 MS
PROCALCITONIN SERPL-MCNC: <0.02 NG/ML
Q ONSET: 220 MS
QRS COUNT: 15 BEATS
QRS DURATION: 106 MS
QT INTERVAL: 392 MS
QTC CALCULATION(BAZETT): 482 MS
QTC FREDERICIA: 450 MS
R AXIS: 44 DEGREES
RBC # BLD AUTO: 4.63 X10*6/UL (ref 4.5–5.9)
RBC # BLD AUTO: 4.72 X10*6/UL (ref 4.5–5.9)
RIGHT VENTRICLE PEAK SYSTOLIC PRESSURE: 26.8 MMHG
SODIUM SERPL-SCNC: 139 MMOL/L (ref 136–145)
T AXIS: 57 DEGREES
T OFFSET: 416 MS
T4 FREE SERPL-MCNC: 0.67 NG/DL (ref 0.61–1.12)
T4 FREE SERPL-MCNC: 0.76 NG/DL (ref 0.61–1.12)
TRICUSPID ANNULAR PLANE SYSTOLIC EXCURSION: 2.5 CM
TRIGL SERPL-MCNC: 101 MG/DL (ref 0–149)
TSH SERPL-ACNC: 0.23 MIU/L (ref 0.44–3.98)
VENTRICULAR RATE: 91 BPM
VLDL: 20 MG/DL (ref 0–40)
WBC # BLD AUTO: 7.8 X10*3/UL (ref 4.4–11.3)
WBC # BLD AUTO: 8 X10*3/UL (ref 4.4–11.3)

## 2024-01-26 PROCEDURE — 70551 MRI BRAIN STEM W/O DYE: CPT | Performed by: RADIOLOGY

## 2024-01-26 PROCEDURE — 70544 MR ANGIOGRAPHY HEAD W/O DYE: CPT

## 2024-01-26 PROCEDURE — 70547 MR ANGIOGRAPHY NECK W/O DYE: CPT

## 2024-01-26 PROCEDURE — 80061 LIPID PANEL: CPT | Performed by: NURSE PRACTITIONER

## 2024-01-26 PROCEDURE — 80048 BASIC METABOLIC PNL TOTAL CA: CPT | Performed by: NURSE PRACTITIONER

## 2024-01-26 PROCEDURE — 36415 COLL VENOUS BLD VENIPUNCTURE: CPT | Performed by: NURSE PRACTITIONER

## 2024-01-26 PROCEDURE — 99231 SBSQ HOSP IP/OBS SF/LOW 25: CPT

## 2024-01-26 PROCEDURE — 70544 MR ANGIOGRAPHY HEAD W/O DYE: CPT | Performed by: RADIOLOGY

## 2024-01-26 PROCEDURE — 83036 HEMOGLOBIN GLYCOSYLATED A1C: CPT | Mod: AHULAB | Performed by: NURSE PRACTITIONER

## 2024-01-26 PROCEDURE — 93306 TTE W/DOPPLER COMPLETE: CPT

## 2024-01-26 PROCEDURE — 84145 PROCALCITONIN (PCT): CPT | Mod: AHULAB | Performed by: NURSE PRACTITIONER

## 2024-01-26 PROCEDURE — 2500000001 HC RX 250 WO HCPCS SELF ADMINISTERED DRUGS (ALT 637 FOR MEDICARE OP)

## 2024-01-26 PROCEDURE — 84439 ASSAY OF FREE THYROXINE: CPT | Performed by: HOSPITALIST

## 2024-01-26 PROCEDURE — 84481 FREE ASSAY (FT-3): CPT | Mod: AHULAB | Performed by: HOSPITALIST

## 2024-01-26 PROCEDURE — 99221 1ST HOSP IP/OBS SF/LOW 40: CPT | Performed by: INTERNAL MEDICINE

## 2024-01-26 PROCEDURE — G0378 HOSPITAL OBSERVATION PER HR: HCPCS

## 2024-01-26 PROCEDURE — 70547 MR ANGIOGRAPHY NECK W/O DYE: CPT | Performed by: RADIOLOGY

## 2024-01-26 PROCEDURE — 2500000001 HC RX 250 WO HCPCS SELF ADMINISTERED DRUGS (ALT 637 FOR MEDICARE OP): Performed by: NURSE PRACTITIONER

## 2024-01-26 PROCEDURE — 84443 ASSAY THYROID STIM HORMONE: CPT | Performed by: NURSE PRACTITIONER

## 2024-01-26 PROCEDURE — 70551 MRI BRAIN STEM W/O DYE: CPT

## 2024-01-26 PROCEDURE — 93306 TTE W/DOPPLER COMPLETE: CPT | Performed by: INTERNAL MEDICINE

## 2024-01-26 PROCEDURE — 99222 1ST HOSP IP/OBS MODERATE 55: CPT | Performed by: STUDENT IN AN ORGANIZED HEALTH CARE EDUCATION/TRAINING PROGRAM

## 2024-01-26 PROCEDURE — 84439 ASSAY OF FREE THYROXINE: CPT | Performed by: INTERNAL MEDICINE

## 2024-01-26 PROCEDURE — 85027 COMPLETE CBC AUTOMATED: CPT | Performed by: NURSE PRACTITIONER

## 2024-01-26 RX ORDER — AMOXICILLIN AND CLAVULANATE POTASSIUM 875; 125 MG/1; MG/1
1 TABLET, FILM COATED ORAL 2 TIMES DAILY
Qty: 14 TABLET | Refills: 0 | Status: SHIPPED | OUTPATIENT
Start: 2024-01-26 | End: 2024-02-02

## 2024-01-26 RX ORDER — ATORVASTATIN CALCIUM 40 MG/1
40 TABLET, FILM COATED ORAL NIGHTLY
Qty: 30 TABLET | Refills: 0 | Status: SHIPPED | OUTPATIENT
Start: 2024-01-26 | End: 2024-02-15 | Stop reason: SDUPTHER

## 2024-01-26 RX ORDER — PANTOPRAZOLE SODIUM 40 MG/1
40 TABLET, DELAYED RELEASE ORAL
Qty: 30 TABLET | Refills: 0 | Status: SHIPPED | OUTPATIENT
Start: 2024-01-26 | End: 2024-02-15

## 2024-01-26 RX ORDER — NAPROXEN SODIUM 220 MG/1
81 TABLET, FILM COATED ORAL DAILY
Status: DISCONTINUED | OUTPATIENT
Start: 2024-01-26 | End: 2024-01-26 | Stop reason: HOSPADM

## 2024-01-26 RX ORDER — ATORVASTATIN CALCIUM 40 MG/1
40 TABLET, FILM COATED ORAL NIGHTLY
Status: DISCONTINUED | OUTPATIENT
Start: 2024-01-26 | End: 2024-01-26 | Stop reason: HOSPADM

## 2024-01-26 RX ORDER — CLOPIDOGREL BISULFATE 75 MG/1
75 TABLET ORAL DAILY
Qty: 21 TABLET | Refills: 0 | Status: SHIPPED | OUTPATIENT
Start: 2024-01-26 | End: 2024-02-15 | Stop reason: SDUPTHER

## 2024-01-26 RX ORDER — AZITHROMYCIN 500 MG/1
500 TABLET, FILM COATED ORAL DAILY
Qty: 2 TABLET | Refills: 0 | Status: SHIPPED | OUTPATIENT
Start: 2024-01-26 | End: 2024-01-28

## 2024-01-26 RX ORDER — MECLIZINE HYDROCHLORIDE 25 MG/1
25 TABLET ORAL 3 TIMES DAILY PRN
Qty: 30 TABLET | Refills: 11 | Status: SHIPPED | OUTPATIENT
Start: 2024-01-26

## 2024-01-26 RX ORDER — NAPROXEN SODIUM 220 MG/1
81 TABLET, FILM COATED ORAL DAILY
Qty: 30 TABLET | Refills: 0 | Status: SHIPPED | OUTPATIENT
Start: 2024-01-27 | End: 2024-02-15

## 2024-01-26 RX ORDER — CLOPIDOGREL BISULFATE 75 MG/1
75 TABLET ORAL DAILY
Status: DISCONTINUED | OUTPATIENT
Start: 2024-01-26 | End: 2024-01-26 | Stop reason: HOSPADM

## 2024-01-26 RX ADMIN — ASPIRIN 81 MG CHEWABLE TABLET 81 MG: 81 TABLET CHEWABLE at 13:16

## 2024-01-26 RX ADMIN — CLOPIDOGREL 75 MG: 75 TABLET ORAL at 18:08

## 2024-01-26 ASSESSMENT — ENCOUNTER SYMPTOMS
RESPIRATORY NEGATIVE: 1
MUSCULOSKELETAL NEGATIVE: 1
DIZZINESS: 1
ENDOCRINE NEGATIVE: 1
LIGHT-HEADEDNESS: 1
ALLERGIC/IMMUNOLOGIC NEGATIVE: 1
PSYCHIATRIC NEGATIVE: 1
CARDIOVASCULAR NEGATIVE: 1
CONSTITUTIONAL NEGATIVE: 1
EYES NEGATIVE: 1
GASTROINTESTINAL NEGATIVE: 1
HEMATOLOGIC/LYMPHATIC NEGATIVE: 1

## 2024-01-26 ASSESSMENT — ACTIVITIES OF DAILY LIVING (ADL): LACK_OF_TRANSPORTATION: NO

## 2024-01-26 NOTE — H&P
History Of Present Illness  Mele Kruger is a 48 y.o. male presenting with dizziness was at work and sent in by 911.    ED HPI:  HPI:    Tyrel Kruger is a 48 y.o. male PMH hypertension, GERD, smoking presenting with acute onset dizziness.  Patient states he was at work and got up and walked and felt sudden onset room spinning dizziness.  No headache or neck pain.  No focal weakness numbness.  No discoordination.  States that he sat down he continued to feel dizzy.  States that he began having nausea vomiting and sweatiness.  Denies any chest pain.  No palpitation.  No shortness of breath.  States he has had small episodes that have self resolved however no significant episode such as this.  Denies any history of recent focal weakness or numbness.  He is on medication for blood pressure thought to potentially episodes were related to this.      On interview in the ED he tells me that he is feeling better. He had a mecalizne an dis iv fluids and iv antibiotics.  Currently denies N/V/Diarrhea or constipation.  At th etime of the event at work he was diaphoretic.   He denies an event like this before.  He is being admitted to observation and being treated for possible pneumonia, vertigo, and syncopal work up.   Cards and neuro consulted - MRI and ECHO imaging ordered and tel     Past Medical History  Past Medical History:   Diagnosis Date    Elevated blood-pressure reading, without diagnosis of hypertension 08/26/2021    Elevated BP without diagnosis of hypertension    Personal history of other infectious and parasitic diseases     History of herpes zoster       Surgical History  Past Surgical History:   Procedure Laterality Date    OTHER SURGICAL HISTORY  08/26/2021    No history of surgery        Social History  He reports that he has been smoking cigarettes. He has been smoking an average of 1 pack per day. He has never used smokeless tobacco. He reports current alcohol use. He reports that he does not use  drugs.  Works full time at [employer]     Family History  Mom HX of colon cancer   Dad HX of kidney failure and empysema     Allergies  Patient has no known allergies.    Review of Systems   Constitutional: Negative.    HENT: Negative.     Eyes: Negative.    Respiratory: Negative.     Cardiovascular: Negative.    Gastrointestinal: Negative.    Endocrine: Negative.    Genitourinary: Negative.    Musculoskeletal: Negative.    Skin: Negative.    Allergic/Immunologic: Negative.    Neurological:  Positive for dizziness, syncope and light-headedness.   Hematological: Negative.    Psychiatric/Behavioral: Negative.          Physical Exam  Constitutional:       Appearance: Normal appearance.   HENT:      Mouth/Throat:      Mouth: Mucous membranes are moist.   Cardiovascular:      Rate and Rhythm: Normal rate and regular rhythm.   Pulmonary:      Effort: Pulmonary effort is normal.      Breath sounds: Normal breath sounds.   Abdominal:      General: Bowel sounds are normal.   Musculoskeletal:         General: Normal range of motion.   Skin:     General: Skin is warm and dry.   Neurological:      General: No focal deficit present.      Mental Status: He is alert.          Last Recorded Vitals  Blood pressure (!) 136/92, pulse 87, temperature 36.3 °C (97.3 °F), resp. rate 14, weight 102 kg (224 lb 13.9 oz), SpO2 96 %.    Relevant Results  Scheduled medications  aspirin, 81 mg, oral, Daily  azithromycin, 500 mg, intravenous, q24h  cefTRIAXone, 1 g, intravenous, q24h  lisinopril, 20 mg, oral, Daily  pantoprazole, 40 mg, oral, Daily before breakfast  perflutren lipid microspheres, 0.5-10 mL of dilution, intravenous, Once in imaging  perflutren protein A microsphere, 0.5 mL, intravenous, Once in imaging  sulfur hexafluoride microsphr, 2 mL, intravenous, Once in imaging      Continuous medications  sodium chloride 0.9%, 75 mL/hr, Last Rate: 75 mL/hr (01/25/24 3951)      PRN medications     Results for orders placed or performed  during the hospital encounter of 01/25/24 (from the past 24 hour(s))   CBC and Auto Differential   Result Value Ref Range    WBC 13.9 (H) 4.4 - 11.3 x10*3/uL    nRBC 0.0 0.0 - 0.0 /100 WBCs    RBC 5.20 4.50 - 5.90 x10*6/uL    Hemoglobin 15.9 13.5 - 17.5 g/dL    Hematocrit 46.6 41.0 - 52.0 %    MCV 90 80 - 100 fL    MCH 30.6 26.0 - 34.0 pg    MCHC 34.1 32.0 - 36.0 g/dL    RDW 12.9 11.5 - 14.5 %    Platelets 328 150 - 450 x10*3/uL    Neutrophils % 82.1 40.0 - 80.0 %    Immature Granulocytes %, Automated 0.4 0.0 - 0.9 %    Lymphocytes % 11.4 13.0 - 44.0 %    Monocytes % 5.2 2.0 - 10.0 %    Eosinophils % 0.6 0.0 - 6.0 %    Basophils % 0.3 0.0 - 2.0 %    Neutrophils Absolute 11.41 (H) 1.20 - 7.70 x10*3/uL    Immature Granulocytes Absolute, Automated 0.06 0.00 - 0.70 x10*3/uL    Lymphocytes Absolute 1.59 1.20 - 4.80 x10*3/uL    Monocytes Absolute 0.72 0.10 - 1.00 x10*3/uL    Eosinophils Absolute 0.08 0.00 - 0.70 x10*3/uL    Basophils Absolute 0.04 0.00 - 0.10 x10*3/uL   Comprehensive metabolic panel   Result Value Ref Range    Glucose 123 (H) 74 - 99 mg/dL    Sodium 137 136 - 145 mmol/L    Potassium 4.0 3.5 - 5.3 mmol/L    Chloride 102 98 - 107 mmol/L    Bicarbonate 25 21 - 32 mmol/L    Anion Gap 14 10 - 20 mmol/L    Urea Nitrogen 17 6 - 23 mg/dL    Creatinine 0.88 0.50 - 1.30 mg/dL    eGFR >90 >60 mL/min/1.73m*2    Calcium 9.2 8.6 - 10.3 mg/dL    Albumin 4.0 3.4 - 5.0 g/dL    Alkaline Phosphatase 56 33 - 120 U/L    Total Protein 7.0 6.4 - 8.2 g/dL    AST 10 9 - 39 U/L    Bilirubin, Total 0.5 0.0 - 1.2 mg/dL    ALT 15 10 - 52 U/L   Troponin I, High Sensitivity   Result Value Ref Range    Troponin I, High Sensitivity <3 0 - 20 ng/L   Sars-CoV-2 and Influenza A/B PCR   Result Value Ref Range    Flu A Result Not Detected Not Detected    Flu B Result Not Detected Not Detected    Coronavirus 2019, PCR Not Detected Not Detected      XR chest 1 view   Final Result   Mild left lung base infiltrate and suggestion of left lung base    nodule. Follow-up with PA and lateral chest x-rays recommended to   document resolution. Further evaluation with chest CT can be   performed for better assessment as clinically warranted.                  Signed by: Simón Senior 1/25/2024 6:51 PM   Dictation workstation:   [id number]      CT head wo IV contrast   Final Result   No detected acute intracranial abnormality.        Trace mucosal thickening left maxillary sinus.        No evidence of intracranial hemorrhage or displaced skull fracture.        MACRO:   None        Signed by: Walker Lan 1/25/2024 6:41 PM   Dictation workstation:   [id number]      Transthoracic Echo (TTE) Complete    (Results Pending)   MR angio neck wo IV contrast    (Results Pending)   MR brain wo IV contrast    (Results Pending)   MR angio head wo IV contrast    (Results Pending)      Dizziness & pre-syncopal   ED CT of head negative  ED mecalzine  ED IVF  ED EKG ok  Tele  ECHO  Cards consulted   MRA of head and neck   Neuro consulted   Asa  Lipids and hgba1c    HX of HTN  C/w home lisinopril    Smoker  Denies need for nicotine     DVT prophylaxis     Admit to observation    Libby Pham, APRN-CNP

## 2024-01-26 NOTE — PROGRESS NOTES
"Pharmacy Medication History Review    Tyrel Kruger \"Mele\" is a 48 y.o. male admitted for Abdominal pain. Pharmacy reviewed the patient's [id number] medications and allergies for accuracy.    The list below reflectives the updated PTA list. Please review each medication in order reconciliation for additional clarification and justification.  Prior to Admission medications    Medication Sig Start Date End Date Taking? Authorizing Provider   lisinopril 20 mg tablet Take 1 tablet (20 mg) by mouth once daily. 11/6/23 2/4/24  Fritz Mckeon MD   omeprazole (PriLOSEC) 40 mg DR capsule Take 1 capsule (40 mg) by mouth once daily in the morning. Take before meals. 2/6/19   Historical Provider, MD        The list below reflectives the updated allergy list. Please review each documented allergy for additional clarification and justification.  Allergies  Reviewed by Jose Manuel Walker, EMT on 1/25/2024   No Known Allergies         Below are additional concerns with the patient's PTA list.  Prior to Admission Medications   Prescriptions Last Dose Informant   lisinopril 20 mg tablet 1/24/2024 at pm Self   Sig: Take 1 tablet (20 mg) by mouth once daily.   omeprazole (PriLOSEC) 40 mg DR capsule 1/24/2024 at pm Self   Sig: Take 1 capsule (40 mg) by mouth once daily in the morning. Take before meals.      Facility-Administered Medications: None      Spoke with Patient  Marjorie Monroy CPhT    "

## 2024-01-26 NOTE — CONSULTS
"Inpatient consult to Cardiology  Consult performed by: Priya Thompson, SHEYLA-CNP  Consult ordered by: SHEYLA Marie-CNP  Reason for consult: dizziness,        History Of Present Illness:    Tyrel Kruger \"Mele\" is a 48 y.o. male with past medical history of hypertension, tobacco abuse, GERD who presented to ED with new onset dizziness.  Cardiology consulted for dizziness in patient who is a smoker.      States he was in his usual state of health yesterday at work as a .  At  2:30pm, he was walking back to his bench, with sudden onset of \"room spinning.\" He became diaphoretic, spinning sensation, and he needed to sit down.  He feared he would pass out.  He vomited about 3 times.  He felt better for a few minutes, then has reoccurrence of the same symptoms.  He has had a few of these episodes in the past few months.  He recently went to PCP fearing high blood pressure, and was started on lisinopril 20mg daily, which he has been taking consistently for the past two months.  Denies ever having chest pain or angina.  Denies any headaches.  No fever, chills, or cough.  Very occasional palpitations that are not bothersome.      Home cardiac medications: lisinopril 20mg daily    Does not follow with cardiology as outpatient.      Past Cardiology Tests (Last 3 Years):  EKG:  Results for orders placed during the hospital encounter of 01/25/24    ECG 12 lead (Preliminary)  This result has not been signed. Information might be incomplete.    Narrative  Normal sinus rhythm  Prolonged QT  Abnormal ECG  No previous ECGs available    Echo:  No results found for this or any previous visit.    Ejection Fractions:  No results found for: \"EF\"  Cath:  No results found for this or any previous visit.    Stress Test:  No results found for this or any previous visit.    Cardiac Imaging:  No results found for this or any previous visit.      Past Medical History:  He has a past medical history of Elevated blood-pressure " "reading, without diagnosis of hypertension (2021) and Personal history of other infectious and parasitic diseases.    Past Surgical History:  He has a past surgical history that includes Other surgical history (2021).      Social History:  He reports that he has been smoking cigarettes. He has been smoking an average of 1 pack per day. He has never used smokeless tobacco. He reports current alcohol use. He reports that he does not use drugs.    Family History:  No family history on file.     Allergies:  Patient has no known allergies.    ROS:  10 point review of systems including (Constitutional, Eyes, ENMT, Respiratory, Cardiac, Gastrointestinal, Neurological, Psychiatric, and Hematologic) was performed and is otherwise negative.    Objective Data:  Last Recorded Vitals:  Vitals:    24 0545 24 0600 24 0615 24 0700   BP:  127/73  106/73   Pulse: 70 72 71 74   Resp: 14 14 13 11   Temp:       TempSrc:       SpO2: 96% 99% 99% 97%   Weight:       Height:         Medical Gas Therapy: None (Room air)  Weight  Av kg (229 lb 15 oz)  Min: 102 kg (224 lb 13.9 oz)  Max: 107 kg (235 lb)    LABS:  CMP:  Results from last 7 days   Lab Units 24  0054 24  1717   SODIUM mmol/L 139 137   POTASSIUM mmol/L 3.9 4.0   CHLORIDE mmol/L 106 102   CO2 mmol/L 25 25   ANION GAP mmol/L 12 14   BUN mg/dL 13 17   CREATININE mg/dL 0.68 0.88   EGFR mL/min/1.73m*2 >90 >90   ALBUMIN g/dL  --  4.0   ALT U/L  --  15   AST U/L  --  10   BILIRUBIN TOTAL mg/dL  --  0.5     CBC:  Results from last 7 days   Lab Units 24  0628 24  0054 24  1717   WBC AUTO x10*3/uL 7.8 8.0 13.9*   HEMOGLOBIN g/dL 14.5 14.2 15.9   HEMATOCRIT % 42.9 41.5 46.6   PLATELETS AUTO x10*3/uL 258 255 328   MCV fL 91 90 90     COAG:     ABO: No results found for: \"ABO\"  HEME/ENDO:  Results from last 7 days   Lab Units 24  0054   TSH mIU/L 0.23*      CARDIAC:   Results from last 7 days   Lab Units " 01/25/24  1717   TROPHS ng/L <3      Results from last 7 days   Lab Units 01/26/24  0054   LDL CALC mg/dL 85   VLDL mg/dL 20   CHOLESTEROL/HDL RATIO  4.3        Last I/O:    Intake/Output Summary (Last 24 hours) at 1/26/2024 0843  Last data filed at 1/26/2024 0629  Gross per 24 hour   Intake 1858.75 ml   Output --   Net 1858.75 ml     Net IO Since Admission: 1,858.75 mL [01/26/24 0843]      Imaging Results:  ECG 12 lead    Result Date: 1/26/2024  Normal sinus rhythm Prolonged QT Abnormal ECG No previous ECGs available    XR chest 1 view    Result Date: 1/25/2024        Mild left lung base infiltrate and suggestion of left lung base nodule. Follow-up with PA and lateral chest x-rays recommended to document resolution. Further evaluation with chest CT can be performed for better assessment as clinically warranted.       Signed by: Simón Senior 1/25/2024 6:51 PM Dictation workstation:   [id number]    CT head wo IV contrast    Result Date: 1/25/2024      No detected acute intracranial abnormality.   Trace mucosal thickening left maxillary sinus.   No evidence of intracranial hemorrhage or displaced skull fracture.   MACRO: None   Signed by: Walker Lan 1/25/2024 6:41 PM Dictation workstation:   [id number]      Inpatient Medications:  Scheduled medications   Medication Dose Route Frequency    aspirin  81 mg oral Daily    azithromycin  500 mg intravenous q24h    cefTRIAXone  1 g intravenous q24h    lisinopril  20 mg oral Daily    pantoprazole  40 mg oral Daily before breakfast    perflutren lipid microspheres  0.5-10 mL of dilution intravenous Once in imaging    perflutren protein A microsphere  0.5 mL intravenous Once in imaging    sulfur hexafluoride microsphr  2 mL intravenous Once in imaging     PRN medications   Medication     Continuous Medications   Medication Dose Last Rate    sodium chloride 0.9%  75 mL/hr Stopped (01/26/24 0629)       Outpatient Medications:  Current Outpatient Medications   Medication  "Instructions    lisinopril 20 mg, oral, Daily    omeprazole (PRILOSEC) 40 mg, oral, Daily before breakfast       Physical Exam:    General: Well-developed, well-nourished, in no acute distress  HEENT: Normocephalic, atraumatic  Neck: Supple, JVP is normal negative hepatojugular reflux 2+ carotid pulses without bruit  Pulmonary: Normal respiratory effort, clear to auscultation  Cardiovascular: Normal S1 and S2, no murmurs rubs or gallops  Abdomen: Soft, nontender, nondistended  Extremities: Warm without edema, 2+ radial pulses bilaterally   Neurologic: Alert and oriented x3  Psychiatric: Normal mood and affect     Assessment/Plan   Tyrel Kruger \"Mele\" is a 48 y.o. male with past medical history of hypertension, tobacco abuse, GERD who presented to ED with new onset dizziness.  Cardiology consulted for dizziness in patient who is a smoker.     1/25 > CXR: Mild left lung base infiltrate and suggestion of left lung base nodule. Follow-up with PA and lateral chest x-rays recommended to document resolution. Further evaluation with chest CT can be performed for better assessment as clinically warranted.  HS troponin.  Normotensive, hemodynamically stable, appears euvolemic.     Assessment:  # Dizziness - most likely vertigo with room spinning, and vomiting.   # Hypertension, stable  # Tobacco use  # Possible pneumonia, procalcitonin pending    Plan:  - We will obtain a transthoracic echocardiogram for structural evaluation including ejection fraction, assessment of regional wall motion abnormalities or valvular disease, and further evaluation of hemodynamics.  - Continue home lisinopril 20mg oral daily      Code Status:  Full Code    "

## 2024-01-26 NOTE — CONSULTS
"Subjective   History Of Present Illness    Mr. Sarah \"Mele\" Saskia is a 49 y/o RH man with hx of HTN who presented on 1/25/24 for concerns of acute onset room spinning sensation. Neurology consulted for vertigo.     He was in his normal state of health until the day of admission when he turned his head to the right and developed acute onset room spinning sensation with an episode of nausea. It was so severe, he called EMS. He denied any focal motor weakness, facial droop, sensory changes,  or vision loss. He states that he has had this happen once or twice in the past with head turning, but it lasted only several minutes and usually resolved on its own and it was never this severe.     Denies tinnitus, fever, hearing loss.       In the ED, BP was 147/91. ED provider noted \"R beating nystagmus, corrective saccade on head impulse, neg test of skew, no truncal instability.\" He was given meclizine and IVF with resolution of his symptoms with a total duration of 1-2 hours. CBC and RFP unremarkable.     Personally reviewed the following imaging:     CTH - no evidence of acute infarct  MRI Brain - small punctate infarcts in the right inferior cerebellum  MRA H/N - no evidence of LVO or significant stenosis in the intracranial and extracranial vessels.     Review of systems as noted above, otherwise negative.    Past Medical History  Past Medical History:   Diagnosis Date    Elevated blood-pressure reading, without diagnosis of hypertension 08/26/2021    Elevated BP without diagnosis of hypertension    Personal history of other infectious and parasitic diseases     History of herpes zoster     Surgical History  Past Surgical History:   Procedure Laterality Date    OTHER SURGICAL HISTORY  08/26/2021    No history of surgery     Social History  Social History     Tobacco Use    Smoking status: Every Day     Packs/day: 1     Types: Cigarettes    Smokeless tobacco: Never   Substance Use Topics    Alcohol use: Yes     Comment: " "social    Drug use: Never     Allergies  Reviewed in EMR       Objective   Last Recorded Vitals  Blood pressure 108/72, pulse 85, temperature 36.3 °C (97.3 °F), resp. rate 13, height 1.702 m (5' 7\"), weight 107 kg (235 lb), SpO2 96 %.    Scheduled medications  aspirin, 81 mg, oral, Daily  atorvastatin, 40 mg, oral, Nightly  azithromycin, 500 mg, intravenous, q24h  cefTRIAXone, 1 g, intravenous, q24h  clopidogrel, 75 mg, oral, Daily  pantoprazole, 40 mg, oral, Daily before breakfast  perflutren lipid microspheres, 0.5-10 mL of dilution, intravenous, Once in imaging  perflutren protein A microsphere, 0.5 mL, intravenous, Once in imaging  sulfur hexafluoride microsphr, 2 mL, intravenous, Once in imaging      Continuous medications  sodium chloride 0.9%, 75 mL/hr, Last Rate: Stopped (01/26/24 0629)      PRN medications       Exam   Neurological Exam  Physical Exam      MENTAL STATUS:  General appearance: Pleasant. NAD.     MENTAL STATUS:  Orientation was normal to person, exact date, and location. Able to follow complex commands. Attention span and concentration were normal. Language testing was normal for comprehension, repetition, expression, and naming. The patient could correctly interpret a picture, my watch, band of my watch, and my pen as well as tell me what they are used for. General fund of knowledge was intact. Memory was intact.    CRANIAL NERVES:  - II:  Visual fields intact to confrontation bilaterally tested individually and together  - III, IV, VI: SADIE, EOMI. Notable sustained rotary nystagmus on right gaze  - V: V1-V3 sensation intact bilaterally  - VII: Face muscles symmetric with smile and eye closure  - VIII: Intact to finger rub bilaterally  - IX, X: Palate elevated symmetrically bilaterally, no hoarseness  - XI: 5/5 strength on shoulder shrugging bilaterally  - XII: Tongue midline without atrophy or fasciculation      [place] Hallpike Negative bilaterally    MOTOR: Tone and bulk normal in all " extremities    STRENGTH:  R L   5 5 Shoulder abduction  5 5 Elbow flexion  5 5 Elbow extension  5 5  strength    5 5 Hip flexion  5 5 Hip extension  5 5 Knee flexion  5 5 Knee extension  5 5 Ankle dorsiflexion  5 5 Ankle plantarflexion      REFLEXES:    R           L  Biceps  +2 +2  Triceps  +2 +2    Patellar  +2 +2  Achilles +2 +2    Planter response: Downgoing bilaterally     COORDINATION: Intact on finger to nose bl, intact on heel to shin bl    SENSORY: Intact to light touch, pin prick, vibration in bl UE and LE    GAIT: Normal standard gait. Able to rise without aid. Normal arm swing and gait.         Relevant Results    Lab Results  Results from last 72 hours   Lab Units 01/26/24  0054 01/25/24  1717   GLUCOSE mg/dL 103* 123*   SODIUM mmol/L 139 137   POTASSIUM mmol/L 3.9 4.0   CHLORIDE mmol/L 106 102   CO2 mmol/L 25 25   ANION GAP mmol/L 12 14   BUN mg/dL 13 17   CREATININE mg/dL 0.68 0.88   EGFR mL/min/1.73m*2 >90 >90   CALCIUM mg/dL 8.5* 9.2   ALBUMIN g/dL  --  4.0      Results from last 72 hours   Lab Units 01/26/24  0628 01/26/24  0054   WBC AUTO x10*3/uL 7.8 8.0   NRBC AUTO /100 WBCs 0.0 0.0   RBC AUTO x10*6/uL 4.72 4.63   HEMOGLOBIN g/dL 14.5 14.2   HEMATOCRIT % 42.9 41.5   MCV fL 91 90   MCH pg 30.7 30.7   MCHC g/dL 33.8 34.2   RDW % 13.0 12.8   PLATELETS AUTO x10*3/uL 258 255        MR angio neck wo IV contrast   Final Result   Moderate number of foci with restricted diffusion at least some of   which demonstrate FLAIR hyperintensity within the inferior aspect of   the right cerebellar hemisphere likely at least principally within   the right posteroinferior cerebellar artery territory compatible with   acute to early subacute infarcts. No hemorrhagic transformation or   mass effect was identified. No definite abnormality of the   tnlnjx-xx-Ftfqmt or vasculature of the midportion of the neck   detected on noncontrast MRA.        MACRO:   Antelmo Guadarrama discussed the significance and urgency of this  critical   finding by telephone with  Dr. Jenny Cedeno on 1/26/2024 at 3:00   pm.  (**-RCF-**) Findings:  See findings.        Signed by: Antelmo Guadarrama 1/26/2024 3:01 PM   Dictation workstation:   [id number]      MR brain wo IV contrast   Final Result   Moderate number of foci with restricted diffusion at least some of   which demonstrate FLAIR hyperintensity within the inferior aspect of   the right cerebellar hemisphere likely at least principally within   the right posteroinferior cerebellar artery territory compatible with   acute to early subacute infarcts. No hemorrhagic transformation or   mass effect was identified. No definite abnormality of the   pjflyq-jn-Uzrjth or vasculature of the midportion of the neck   detected on noncontrast MRA.        MACRO:   Antelmo Guadarrama discussed the significance and urgency of this critical   finding by telephone with  Dr. Jenny Cedeno on 1/26/2024 at 3:00   pm.  (**-RCF-**) Findings:  See findings.        Signed by: Antelmo Guadarrama 1/26/2024 3:01 PM   Dictation workstation:   [id number]      MR angio head wo IV contrast   Final Result   Moderate number of foci with restricted diffusion at least some of   which demonstrate FLAIR hyperintensity within the inferior aspect of   the right cerebellar hemisphere likely at least principally within   the right posteroinferior cerebellar artery territory compatible with   acute to early subacute infarcts. No hemorrhagic transformation or   mass effect was identified. No definite abnormality of the   dsrwdw-sj-Gaaeox or vasculature of the midportion of the neck   detected on noncontrast MRA.        MACRO:   Antelmo Guadarrama discussed the significance and urgency of this critical   finding by telephone with  Dr. Jenny Cedeno on 1/26/2024 at 3:00   pm.  (**-RCF-**) Findings:  See findings.        Signed by: Antelmo Guadarrama 1/26/2024 3:01 PM   Dictation workstation:   [id number]      Transthoracic Echo (TTE) Complete   Final Result      XR  chest 1 view   Final Result   Mild left lung base infiltrate and suggestion of left lung base   nodule. Follow-up with PA and lateral chest x-rays recommended to   document resolution. Further evaluation with chest CT can be   performed for better assessment as clinically warranted.                  Signed by: Simón Seinor 1/25/2024 6:51 PM   Dictation workstation:   [id number]      CT head wo IV contrast   Final Result   No detected acute intracranial abnormality.        Trace mucosal thickening left maxillary sinus.        No evidence of intracranial hemorrhage or displaced skull fracture.        MACRO:   None        Signed by: Walker Lan 1/25/2024 6:41 PM   Dictation workstation:   [id number]            Assessment/Plan     Impression:   Acute vertigo. Initial impression based on history and exam was concerning for BPPV as the patient had rapid onset vertigo with head turning, lasting one hour, resolved with meclizine in addition to rotary nystagmus in right gaze on exam. [place] Hallpike negative. However, given MRI findings of right cerebellar infarcts, further stroke workup is warranted and the etiology of his vertigo is most likely central in nature.      Recommendations:   - ASA 81mg and Plavix 75mg for 21 days followed by ASA monotherapy unless contraindications from bleeding perspective  - Start Atorvastatin 40mg daily  - Echocardiogram with bubble study  - Lipid panel, HgbA1c, TSH, BNP  - Holter at discharge   - Stroke risk factors and warning signs discussed with patient   - Follow up with Stroke Neurology, Dr. Ami Piedra, as outpatient   - Vestibular rehab recommended if symptoms persist  - Recommendations relayed to Jese Lincoln of primary team    I spent 60 minutes in the professional and overall care of this patient.      Bob Phillips DO

## 2024-01-26 NOTE — PROGRESS NOTES
Transitional Care Coordination Progress Note:  Plan per Medical/Surgical team: treatment of possible pneumonia, vertigo, and syncopal with IV ATB, antivert, IV fluids, Cardio and neuro consults, MRI/MRA and ECHO pending  Status: Observation  Payor source: [employer]  Discharge disposition: home with sign other  Potential Barriers: labs & vitals stable  ADOD: 1/27/2024  FRANCISCO J Zeng RN, BSN Transitional Care Coordinator ED# [phone]      01/26/24 0822   Discharge Planning   Living Arrangements Spouse/significant other   Support Systems Spouse/significant other   Assistance Needed smoking cessation education   Type of Residence Private residence   Number of Stairs to Enter Residence 4   Number of Stairs Within Residence 14   Home or Post Acute Services None   Patient expects to be discharged to: home with sign other   Does the patient need discharge transport arranged? Yes   RoundTrip coordination needed? Yes   Has discharge transport been arranged? No   Financial Resource Strain   How hard is it for you to pay for the very basics like food, housing, medical care, and heating? Not hard   Housing Stability   In the last 12 months, was there a time when you were not able to pay the mortgage or rent on time? N   In the last 12 months, how many places have you lived? 1   In the last 12 months, was there a time when you did not have a steady place to sleep or slept in a shelter (including now)? N   Transportation Needs   In the past 12 months, has lack of transportation kept you from medical appointments or from getting medications? no   In the past 12 months, has lack of transportation kept you from meetings, work, or from getting things needed for daily living? No

## 2024-01-26 NOTE — NURSING NOTE
Discharge instructions and home going medications reviewed using the teach back method with patient. Patient's health related risk factors discussed as well.  Patient educated to look for any worsening signs and symptoms and educated to seek medical attention if experiencing any medical emergency.  Patient made aware to follow up with outpatient clinics as scheduled.  Patient discharged via private vehicle with all personal belongings, accompanied by family/friend in stable condition with VS WNL and able to ambulate independently without devices.  Written discharge instructions were given and patient verbalized understanding of instructions, and all questions were addressed.

## 2024-01-26 NOTE — DISCHARGE INSTRUCTIONS
Do not take Omeprazole while you are taking Plavix. You can take Protonix instead. You can resume your Omeprazole after you have completed your 21 days of Plavix.     You will need to take Plavix for 21 days. You will continue to take the aspirin unless advised by a physician to stop the medication    While being on Plavix, please report any signs of bleeding right away, including dark or bloody stools, blood in urine, vomiting of blood  Avoid any activity that would put you at risk for head trauma / falls / bleeding, while on blood thinners    While on the Plavix, please report any signs of bleeding right away, including dark or bloody stools, blood in urine, vomiting of blood    You will need your TSH (Thyroid) level rechecked in 8 weeks    Follow up with Dr. Piedra (stroke neurologist) in 2 weeks, a referral has been placed to the  service, someone should call you to help you arrange an appointment  You will also need to follow up with your PCP in 1 week    To schedule in-person and remote device follow-up appointments, call [phone]. This number should be able to help you arrange an appointment to get your Holter monitor (AKA Zio Patch) placed on Monday.    Your chest xray was showing pneumonia, 2 antibiotics were sent to your pharmacy on file ([facility] [address])    SIGNS OF STROKE:  (1) Sudden numbness or weakness, especially on one side of the body, (2) Sudden confusion, trouble speaking or understanding speech, (3) Sudden trouble seeing in one or both eyes, (4) Sudden trouble walking, dizziness, or loss of balance, (5) Sudden severe headache.    If you experience any of these symptoms, CALL 911 AND SEEK IMMEDIATE MEDICAL ATTENTION.    RISK FACTORS FOR STROKE include high blood pressure, high cholesterol, heart disease, diabetes, atrial fibrillation, smoking, obesity.  Know your risk factors and FOLLOW-UP WITH YOUR PHYSICIAN to help you manage them and make appropriate changes to  your lifestyle.

## 2024-01-26 NOTE — PROGRESS NOTES
Home with sign other      01/26/24 5697   Current Planned Discharge Disposition   Current Planned Discharge Disposition Home

## 2024-01-26 NOTE — SIGNIFICANT EVENT
TTE with normal LA, EF of 60-65%. No comment on PFO despite recommendation. Can consider repeat as outpatient with bubble study.     Bob Phillips, DO  Neurology

## 2024-01-26 NOTE — DISCHARGE INSTR - OTHER ORDERS
Quitting smoking and maintaining smoking cessation is an important choice you must make. Choosing between your health and quality of life and a cigarette seems like an easy decision but quitting smoking can be challenging. You don't have to do it without help. Talk to your PCP about a successful plan to quit for good. Here are some resources to help you quit smoking:     Smoke Free - Smokefree.gov is intended to help you or someone you care about quit smoking. Use the step-by-step guide or talk to an expert with their LiveHelp line. Go to www.smokefree.gov  Become an Ex-Smoker - The free EX Plan is based on scientific research and practical advice from ex-smokers. It isn't just about quitting smoking. It's about re-learning life without cigarettes.   Go to www.becomeanex.org    Centers for Disease Control - This website has a lot of suggestions for helping you quit, including a quit guide, real life stories, and SmokeFree Text which is a mobile service that will send helpful suggestions to your cell phone. Go to www.cdc.gov/tobacco/campaign/tips    [place] Department of Health ¬- This is the website for the [place] Tobacco Quit Line (see below), but it also has a list of resources in each [facility] [place]. Go to www.healthyohioprogram.org. Click on “Health Lifestyles” near the top of the page. Then click on “Updated Cessation Resources.”  When the list shows, choose your county.     Other Resources  The [place] Tobacco Quit Line is a free tobacco cessation resource for people who are uninsured, get Medicaid, pregnant women, or members of the [place] Tobacco Collaborative. Call 6-800QUIT-NOW or 1-272.339.1949.    [place] [employer] 2-1-1 - You can call or go to their website to get information about programs and resources in your community. Call 211.  Or go to www.211.org.     American Lung Association offers classes at sites, mostly in hospitals. Some places may charge a fee. For a list of classes, go to www.lung.org. In the search box,  type [place] from Smoking Clinic sites: [place].  You can also call 1-942-[facility] or 1-321.464.3155.

## 2024-01-26 NOTE — PROGRESS NOTES
01/26/24 0822   [facility] Disability Status   Are you deaf or do you have serious difficulty hearing? N   Are you blind or do you have serious difficulty seeing, even when wearing glasses? N   Because of a physical, mental, or emotional condition, do you have serious difficulty concentrating, remembering, or making decisions? (5 years old or older) N   Do you have serious difficulty walking or climbing stairs? N   Do you have serious difficulty dressing or bathing? N   Because of a physical, mental, or emotional condition, do you have serious difficulty doing errands alone such as visiting the doctor? N

## 2024-01-27 LAB — T3FREE SERPL-MCNC: 3.1 PG/ML (ref 2.3–4.2)

## 2024-01-27 NOTE — DISCHARGE SUMMARY
Discharge Diagnosis  Abdominal pain    Issues Requiring Follow-Up  MRI with acute stroke, right cerebellar infarcts, needs follow up with neuro-stroke in 2 weeks  Needs follow up with PCP in 1 week  Needs to come back Monday 1/29 for Holter monitor placement  Needs TSH redrawn in 8 weeks  Needs to complete antibiotics for PNA      Discharge Meds     Your medication list        START taking these medications        Instructions Last Dose Given Next Dose Due   amoxicillin-pot clavulanate 875-125 mg tablet  Commonly known as: Augmentin      Take 1 tablet (875 mg) by mouth 2 times a day for 7 days.       aspirin 81 mg chewable tablet  Start taking on: January 27, 2024      Chew 1 tablet (81 mg) once daily. Do not start before January 27, 2024.       atorvastatin 40 mg tablet  Commonly known as: Lipitor      Take 1 tablet (40 mg) by mouth once daily at bedtime.       azithromycin 500 mg tablet  Commonly known as: Zithromax      Take 1 tablet (500 mg) by mouth once daily for 2 days.       clopidogrel 75 mg tablet  Commonly known as: Plavix      Take 1 tablet (75 mg) by mouth once daily for 21 doses.       pantoprazole 40 mg EC tablet  Commonly known as: ProtoNix  Replaces: omeprazole 40 mg DR capsule      Take 1 tablet (40 mg) by mouth once daily in the morning. Take before meals. Do not crush, chew, or split.              CONTINUE taking these medications        Instructions Last Dose Given Next Dose Due   lisinopril 20 mg tablet      Take 1 tablet (20 mg) by mouth once daily.              STOP taking these medications      omeprazole 40 mg DR capsule  Commonly known as: PriLOSEC  Replaced by: pantoprazole 40 mg EC tablet                  Where to Get Your Medications        These medications were sent to [id number] - [place], [place] - [address]   [address] [facility] [place] [address]      Phone: [phone]   amoxicillin-pot clavulanate 875-125 mg tablet  aspirin 81 mg chewable tablet  atorvastatin 40 mg  tablet  azithromycin 500 mg tablet  clopidogrel 75 mg tablet  pantoprazole 40 mg EC tablet         Test Results Pending At Discharge  Pending Labs       Order Current Status    T3, free In process            Hospital Course    Dizziness, near syncope  R/o TIA vs CVA  -neuro and cards consulted and signed off  -CTH neg  -MRI with infarcts right cerebellar   -some component of vertigo as well, Meclizine PRN  -Echo negative (no mention of PFO, can consider repeat outpatient)  -Holter monitor to be placed on Monday 1/28   -Plavix x 21 days, ASA 81mg and statin  -lisinopril held to allow for permissive HTN, can resume tomorrow 1/27 after 24 hour post CVA  -symptoms have nearly resolved   -follow up with Dr. Piedra (neuro-stroke) in 2 weeks    Community acquire PNA  -CXR with LL infiltrate  -cough has improved, no SOB, on RA  -on IV Zithro and Rocephin and switch to PO antibiotics    Hx HTN  -BP stable, can resume lisinopril 1/27    DVT Prophylaxis  Low risk, ambulate    Discharge disposition  Home today    Plan of care discussed with Dr. Blanco    I spent 50 minutes in the professional and overall care of this patient.     Pertinent Physical Exam At Time of Discharge  Physical Exam  Constitutional:       General: He is not in acute distress.     Appearance: Normal appearance.   HENT:      Head: Normocephalic and atraumatic.   Eyes:      Pupils: Pupils are equal, round, and reactive to light.   Cardiovascular:      Rate and Rhythm: Normal rate and regular rhythm.      Heart sounds: No murmur heard.  Pulmonary:      Effort: Pulmonary effort is normal. No respiratory distress.      Breath sounds: Normal breath sounds. No wheezing.   Abdominal:      General: Bowel sounds are normal. There is no distension.      Palpations: Abdomen is soft.      Tenderness: There is no abdominal tenderness.   Musculoskeletal:         General: Normal range of motion.      Right lower leg: No edema.      Left lower leg: No edema.   Skin:      General: Skin is warm and dry.      Capillary Refill: Capillary refill takes less than 2 seconds.   Neurological:      Mental Status: He is alert and oriented to person, place, and time.   Psychiatric:         Mood and Affect: Mood normal.         Behavior: Behavior normal.         Outpatient Follow-Up  Future Appointments   Date Time Provider Department Center   6/13/2024  3:45 PM Fritz Mckeon MD [id number] [facility]         Gerda Lincoln, SHEYLA-CNP

## 2024-01-31 ENCOUNTER — PATIENT OUTREACH (OUTPATIENT)
Dept: CARE COORDINATION | Facility: CLINIC | Age: 49
End: 2024-01-31
Payer: COMMERCIAL

## 2024-02-01 NOTE — PROGRESS NOTES
Outreach call to patient to support a smooth transition of care from recent admission.  Spoke with patient, reviewed discharge medications, discharge instructions, assessed social needs, and discussed follow-up appointment with provider. Enrolled patient in [employer] for additional support and education through transition period.  Will continue to monitor through transition period.    Engagement  Call Start Time: 1150 (2/1/2024 12:04 PM)    Medications  Medications reviewed with patient/caregiver?: Yes (2/1/2024 12:04 PM)  Is the patient having any side effects they believe may be caused by any medication additions or changes?: No (2/1/2024 12:04 PM)  Does the patient have all medications ordered at discharge?: Yes (2/1/2024 12:04 PM)  Care Management Interventions: No intervention needed (2/1/2024 12:04 PM)  Is the patient taking all medications as directed (includes completed medication regime)?: Yes (2/1/2024 12:04 PM)    Appointments  Does the patient have a primary care provider?: Yes (2/1/2024 12:04 PM)  Care Management Interventions: Verified appointment date/time/provider (2/1/2024 12:04 PM)  Has the patient kept scheduled appointments due by today?: Yes (2/1/2024 12:04 PM)    Self Management  What is the home health agency?: None (2/1/2024 12:04 PM)  What Durable Medical Equipment (DME) was ordered?: None (2/1/2024 12:04 PM)    Patient Teaching  Does the patient have access to their discharge instructions?: Yes (2/1/2024 12:04 PM)  Care Management Interventions: Reviewed instructions with patient (2/1/2024 12:04 PM)  What is the patient's perception of their health status since discharge?: Improving (2/1/2024 12:04 PM)  Is the patient/caregiver able to teach back the hierarchy of who to call/visit for symptoms/problems? PCP, Specialist, Home Health nurse, Urgent Care, ED, 911: Yes (2/1/2024 12:04 PM)    Wrap Up  Wrap Up Additional Comments: CM spoke with patient states he is doing okay, he has  returned to work but is pacing himself. Patient had his discharge summary.   Appetite is good, no issues with bowels or urinary.  Patient able to complete ADLs.  Reviewed medications and upcoming appointments.  Patient agreed to CM follow up calls.  Patient had no needs or concerns CM will continue transition of care outreach. LAURA Kerns, RN (2/1/2024 12:04 PM)  Call End Time: 1200 (2/1/2024 12:04 PM)

## 2024-02-05 ENCOUNTER — TELEPHONE (OUTPATIENT)
Dept: PRIMARY CARE | Facility: CLINIC | Age: 49
End: 2024-02-05
Payer: COMMERCIAL

## 2024-02-08 ENCOUNTER — HOSPITAL ENCOUNTER (OUTPATIENT)
Dept: CARDIOLOGY | Facility: CLINIC | Age: 49
Discharge: HOME | End: 2024-02-08
Payer: COMMERCIAL

## 2024-02-08 DIAGNOSIS — I61.9 CVA (CEREBROVASCULAR ACCIDENT DUE TO INTRACEREBRAL HEMORRHAGE) (MULTI): ICD-10-CM

## 2024-02-08 PROCEDURE — 93246 EXT ECG>7D<15D RECORDING: CPT

## 2024-02-08 PROCEDURE — 93248 EXT ECG>7D<15D REV&INTERPJ: CPT | Performed by: INTERNAL MEDICINE

## 2024-02-12 DIAGNOSIS — I61.9 CVA (CEREBROVASCULAR ACCIDENT DUE TO INTRACEREBRAL HEMORRHAGE) (MULTI): Primary | ICD-10-CM

## 2024-02-15 ENCOUNTER — OFFICE VISIT (OUTPATIENT)
Dept: PRIMARY CARE | Facility: CLINIC | Age: 49
End: 2024-02-15
Payer: COMMERCIAL

## 2024-02-15 VITALS
DIASTOLIC BLOOD PRESSURE: 70 MMHG | WEIGHT: 235 LBS | BODY MASS INDEX: 36.88 KG/M2 | HEART RATE: 72 BPM | SYSTOLIC BLOOD PRESSURE: 120 MMHG | RESPIRATION RATE: 16 BRPM | HEIGHT: 67 IN

## 2024-02-15 DIAGNOSIS — I10 PRIMARY HYPERTENSION: ICD-10-CM

## 2024-02-15 DIAGNOSIS — K21.9 GASTROESOPHAGEAL REFLUX DISEASE WITHOUT ESOPHAGITIS: ICD-10-CM

## 2024-02-15 DIAGNOSIS — I61.9 CVA (CEREBROVASCULAR ACCIDENT DUE TO INTRACEREBRAL HEMORRHAGE) (MULTI): ICD-10-CM

## 2024-02-15 DIAGNOSIS — R53.81 MALAISE AND FATIGUE: ICD-10-CM

## 2024-02-15 DIAGNOSIS — R42 DIZZINESS: ICD-10-CM

## 2024-02-15 DIAGNOSIS — I61.9 CVA (CEREBROVASCULAR ACCIDENT DUE TO INTRACEREBRAL HEMORRHAGE) (MULTI): Primary | ICD-10-CM

## 2024-02-15 DIAGNOSIS — R53.83 MALAISE AND FATIGUE: ICD-10-CM

## 2024-02-15 PROBLEM — R55 SYNCOPE: Status: ACTIVE | Noted: 2024-02-15

## 2024-02-15 PROBLEM — J18.9 PNEUMONIA DUE TO INFECTIOUS ORGANISM: Status: ACTIVE | Noted: 2024-02-15

## 2024-02-15 PROBLEM — Z86.19 HISTORY OF HERPES ZOSTER: Status: RESOLVED | Noted: 2024-02-15 | Resolved: 2024-02-15

## 2024-02-15 PROCEDURE — 3074F SYST BP LT 130 MM HG: CPT | Performed by: INTERNAL MEDICINE

## 2024-02-15 PROCEDURE — 3078F DIAST BP <80 MM HG: CPT | Performed by: INTERNAL MEDICINE

## 2024-02-15 PROCEDURE — 99214 OFFICE O/P EST MOD 30 MIN: CPT | Performed by: INTERNAL MEDICINE

## 2024-02-15 RX ORDER — NAPROXEN SODIUM 220 MG/1
TABLET, FILM COATED ORAL
Qty: 30 TABLET | Refills: 0 | Status: SHIPPED | OUTPATIENT
Start: 2024-02-15

## 2024-02-15 RX ORDER — ATORVASTATIN CALCIUM 40 MG/1
40 TABLET, FILM COATED ORAL NIGHTLY
Qty: 90 TABLET | Refills: 0 | Status: SHIPPED | OUTPATIENT
Start: 2024-02-15 | End: 2024-02-19

## 2024-02-15 RX ORDER — CLOPIDOGREL BISULFATE 75 MG/1
75 TABLET ORAL DAILY
Qty: 90 TABLET | Refills: 0 | Status: SHIPPED | OUTPATIENT
Start: 2024-02-15 | End: 2024-02-19

## 2024-02-15 RX ORDER — PANTOPRAZOLE SODIUM 40 MG/1
40 TABLET, DELAYED RELEASE ORAL
Qty: 30 TABLET | Refills: 0 | Status: SHIPPED | OUTPATIENT
Start: 2024-02-15 | End: 2024-02-19

## 2024-02-15 RX ORDER — LISINOPRIL 20 MG/1
20 TABLET ORAL DAILY
Qty: 90 TABLET | Refills: 0 | Status: SHIPPED | OUTPATIENT
Start: 2024-02-15 | End: 2024-02-19

## 2024-02-15 ASSESSMENT — ENCOUNTER SYMPTOMS
ALLERGIC/IMMUNOLOGIC NEGATIVE: 1
MUSCULOSKELETAL NEGATIVE: 1
NEUROLOGICAL NEGATIVE: 1
PSYCHIATRIC NEGATIVE: 1
CONSTITUTIONAL NEGATIVE: 1
HEMATOLOGIC/LYMPHATIC NEGATIVE: 1
RESPIRATORY NEGATIVE: 1
EYES NEGATIVE: 1
ENDOCRINE NEGATIVE: 1
GASTROINTESTINAL NEGATIVE: 1
CARDIOVASCULAR NEGATIVE: 1

## 2024-02-15 NOTE — ASSESSMENT & PLAN NOTE
Stroke prevention by adding the Plavix and aspirin and Atorvastatin   and educate extensively tobacco cessation - and control cholesterol and blood pressure and antiplatelet therapy reviewed with the patient the MRA of the brain

## 2024-02-15 NOTE — PROGRESS NOTES
"Subjective   Patient ID: Mele Kruger is a 48 y.o. male who presents for Hospital Follow-up (Hospital follow up-TIA).had an episode of intense vertigo with spinning of the room and vomiting as he turned his head to the right had scans of the brain and apparently had a pneumonia on the 25th of January 2024     HPI     Review of Systems   Constitutional: Negative.    HENT: Negative.     Eyes: Negative.    Respiratory: Negative.     Cardiovascular: Negative.    Gastrointestinal: Negative.    Endocrine: Negative.    Musculoskeletal: Negative.    Skin: Negative.    Allergic/Immunologic: Negative.    Neurological: Negative.    Hematological: Negative.    Psychiatric/Behavioral: Negative.     All other systems reviewed and are negative.      Objective   Ht 1.702 m (5' 7\")   Wt 107 kg (235 lb)   BMI 36.81 kg/m²   Blood pressure 120/70, pulse 72, resp. rate 16, height 1.702 m (5' 7\"), weight 107 kg (235 lb).   Physical Exam  Vitals and nursing note reviewed.   Constitutional:       Appearance: Normal appearance.   HENT:      Head: Normocephalic and atraumatic.      Right Ear: Tympanic membrane, ear canal and external ear normal.      Left Ear: Tympanic membrane, ear canal and external ear normal. There is no impacted cerumen.      Nose: Nose normal.      Mouth/Throat:      Mouth: Mucous membranes are moist.      Pharynx: Oropharynx is clear.   Eyes:      Extraocular Movements: Extraocular movements intact.      Conjunctiva/sclera: Conjunctivae normal.      Pupils: Pupils are equal, round, and reactive to light.   Cardiovascular:      Rate and Rhythm: Normal rate and regular rhythm.      Pulses: Normal pulses.      Heart sounds: Normal heart sounds. No murmur heard.  Pulmonary:      Effort: Pulmonary effort is normal. No respiratory distress.      Breath sounds: Normal breath sounds. No stridor. No wheezing, rhonchi or rales.   Chest:      Chest wall: No tenderness.   Abdominal:      General: Abdomen is flat. Bowel sounds are " normal. There is no distension.      Palpations: Abdomen is soft. There is no mass.      Tenderness: There is no abdominal tenderness. There is no right CVA tenderness, left CVA tenderness, guarding or rebound.      Hernia: No hernia is present.   Musculoskeletal:         General: Normal range of motion.      Cervical back: Normal range of motion and neck supple.   Skin:     General: Skin is warm.      Capillary Refill: Capillary refill takes less than 2 seconds.   Neurological:      General: No focal deficit present.      Mental Status: He is alert.      Cranial Nerves: No cranial nerve deficit.      Sensory: No sensory deficit.      Motor: No weakness.      Coordination: Coordination normal.      Gait: Gait normal.      Deep Tendon Reflexes: Reflexes normal.   Psychiatric:         Mood and Affect: Mood normal.         Behavior: Behavior normal. Behavior is cooperative.         Thought Content: Thought content normal.         Judgment: Judgment normal.         Assessment/Plan   Problem List Items Addressed This Visit             ICD-10-CM    Primary hypertension I10    Relevant Medications    lisinopril 20 mg tablet    GERD (gastroesophageal reflux disease) K21.9    CVA (cerebrovascular accident due to intracerebral hemorrhage) (CMS/[facility]) I61.9     Stroke prevention by adding the Plavix and aspirin and Atorvastatin   and educate extensively tobacco cessation - and control cholesterol and blood pressure and antiplatelet therapy reviewed with the patient the MRA of the brain          Relevant Medications    clopidogrel (Plavix) 75 mg tablet    atorvastatin (Lipitor) 40 mg tablet    Dizziness R42     Intermittent and on Meclizine PRN -          Malaise and fatigue - Primary R53.81, R53.83     Hypogonadism in male E29.1        Monitor kamilah           Primary hypertension - Primary I10       HTN - hypertension well/controlled .Target BP < 130/80  achieved. Educate low salt diet and exercise with weight loss. Educate  home self monitoring and diary keeping. Educate risks of elevate blood pressure and benefits of prompt treatment.  Refill lisinopril           GERD (gastroesophageal reflux disease) K21.9       GERD - Acid reflux disease. Rx. PPI (Prilosec/Prevacid/Protonix/Nexium) and educate diet and life style changes. Referred patient to an endoscopy (EGD) and check H. Pylori titers.                HTN - hypertension well/controlled.Target BP < 130/80 well/not achieved. Educate low salt diet and exercise with weight loss. Educate home self monitoring and diary keeping. Educate risks of elevate blood pressure and benefits of prompt treatment..start Lisinopril/Hct 10/12.5 mg daily      Dizziness - monitor blood pressure      Fatigue - check CMP(metabolic panel and elctrolytes) , CBC(complete blood cell count), TSH(thyroid function). Insomnia may play a role and sleep studies(rule out sleep apnea) are recommended. Educate sleep hygiene. Consider anxiety disorder vs. depression. Consider Stress test, and 2DECHO.      GERD - Continues the proton pump inhibitors in (PPI) in the form of /Pantoprazole 40 mg daily and educate change in life style and educate diet - Educate extensively diet and elevate the head of the bed at night - at HS = refer to GI for EGD - last upper endoscopy ??? refer back to GI for upper endoscopy -                Immunizations/Injections      very important  Immunizations from outside sources need reconciliation.      Pfizer COVID-19 vaccine, bivalent, age 12 years and older (30 mcg/0.3 mL)9/23/2022  Pfizer Purple Cap SARS-CoV-24/12/2021, 3/22/2021

## 2024-02-18 DIAGNOSIS — I61.9 CVA (CEREBROVASCULAR ACCIDENT DUE TO INTRACEREBRAL HEMORRHAGE) (MULTI): ICD-10-CM

## 2024-02-18 DIAGNOSIS — K21.9 GASTROESOPHAGEAL REFLUX DISEASE WITHOUT ESOPHAGITIS: ICD-10-CM

## 2024-02-18 DIAGNOSIS — I10 PRIMARY HYPERTENSION: ICD-10-CM

## 2024-02-19 RX ORDER — ATORVASTATIN CALCIUM 40 MG/1
40 TABLET, FILM COATED ORAL DAILY
Qty: 90 TABLET | Refills: 0 | Status: SHIPPED | OUTPATIENT
Start: 2024-02-19 | End: 2024-05-01

## 2024-02-19 RX ORDER — CLOPIDOGREL BISULFATE 75 MG/1
75 TABLET ORAL DAILY
Qty: 90 TABLET | Refills: 0 | Status: SHIPPED | OUTPATIENT
Start: 2024-02-19 | End: 2024-02-29 | Stop reason: ALTCHOICE

## 2024-02-19 RX ORDER — LISINOPRIL 20 MG/1
20 TABLET ORAL DAILY
Qty: 90 TABLET | Refills: 0 | Status: SHIPPED | OUTPATIENT
Start: 2024-02-19 | End: 2024-05-01

## 2024-02-19 RX ORDER — PANTOPRAZOLE SODIUM 40 MG/1
40 TABLET, DELAYED RELEASE ORAL
Qty: 90 TABLET | Refills: 0 | Status: SHIPPED | OUTPATIENT
Start: 2024-02-19 | End: 2024-05-01

## 2024-02-21 DIAGNOSIS — J18.9 PNEUMONIA DUE TO INFECTIOUS ORGANISM, UNSPECIFIED LATERALITY, UNSPECIFIED PART OF LUNG: ICD-10-CM

## 2024-02-26 ENCOUNTER — HOSPITAL ENCOUNTER (OUTPATIENT)
Dept: RADIOLOGY | Facility: CLINIC | Age: 49
Discharge: HOME | End: 2024-02-26
Payer: COMMERCIAL

## 2024-02-26 DIAGNOSIS — J18.9 PNEUMONIA DUE TO INFECTIOUS ORGANISM, UNSPECIFIED LATERALITY, UNSPECIFIED PART OF LUNG: ICD-10-CM

## 2024-02-26 PROCEDURE — 71046 X-RAY EXAM CHEST 2 VIEWS: CPT | Performed by: RADIOLOGY

## 2024-02-26 PROCEDURE — 71046 X-RAY EXAM CHEST 2 VIEWS: CPT

## 2024-02-29 ENCOUNTER — TELEMEDICINE (OUTPATIENT)
Dept: NEUROLOGY | Facility: CLINIC | Age: 49
End: 2024-02-29
Payer: COMMERCIAL

## 2024-02-29 DIAGNOSIS — E66.01 CLASS 2 SEVERE OBESITY DUE TO EXCESS CALORIES WITH SERIOUS COMORBIDITY AND BODY MASS INDEX (BMI) OF 36.0 TO 36.9 IN ADULT (MULTI): ICD-10-CM

## 2024-02-29 DIAGNOSIS — I10 PRIMARY HYPERTENSION: ICD-10-CM

## 2024-02-29 DIAGNOSIS — E78.5 DYSLIPIDEMIA: ICD-10-CM

## 2024-02-29 DIAGNOSIS — Z72.0 TOBACCO USE: ICD-10-CM

## 2024-02-29 DIAGNOSIS — I63.441 CEREBROVASCULAR ACCIDENT (CVA) DUE TO EMBOLISM OF RIGHT CEREBELLAR ARTERY (MULTI): Primary | ICD-10-CM

## 2024-02-29 PROBLEM — E66.812 CLASS 2 SEVERE OBESITY DUE TO EXCESS CALORIES WITH SERIOUS COMORBIDITY AND BODY MASS INDEX (BMI) OF 36.0 TO 36.9 IN ADULT: Status: ACTIVE | Noted: 2024-02-29

## 2024-02-29 PROCEDURE — 99215 OFFICE O/P EST HI 40 MIN: CPT | Performed by: PSYCHIATRY & NEUROLOGY

## 2024-02-29 PROCEDURE — 99215 OFFICE O/P EST HI 40 MIN: CPT | Mod: 95 | Performed by: PSYCHIATRY & NEUROLOGY

## 2024-02-29 PROCEDURE — 3008F BODY MASS INDEX DOCD: CPT | Performed by: PSYCHIATRY & NEUROLOGY

## 2024-02-29 ASSESSMENT — ACTIVITIES OF DAILY LIVING (ADL)
BOWELS: INDEPENDENT (INCLUDING BUTTONS, ZIPS, LACES, ETC.)
BLADDER: CONTINENT
STAIRS: INDEPENDENT
TOTAL_SCORE: 100
TOILET_USE: INDEPENDENT (ON AND OFF, DRESSING, WIPING)
DRESSING: INDEPENDENT (INCLUDING BUTTONS, ZIPS, LACES,ETC.)
BED_TO_CHAIR_AND_BACK: INDEPENDENT
GROOMING: INDEPENDENT FACE/HAIR/TEETH.SHAVING (IMPLEMENTS PROVIDED)
BATHING: INDEPENDENT (OR IN SHOWER)
FEEDING: INDEPENDENT
MOBILITY_LEVEL_SURFACES: INDEPENDENT (BUT MAY USE ANY AID FOR EXAMPLE, STICK) > 50 YARDS

## 2024-02-29 NOTE — PROGRESS NOTES
[facility] [place] [facility]   Tyrel Kruger is a 48 y.o. year old male presenting for stroke.   PCP Fritz Mckeon MD    1/2024- admitted to [facility] with vertigo without other focal signs.  CT negative but MRI showed small infarcts in R cerebellum without embolic cause identified., echo without bubble study.   DC on DAPT x 21 days with plan for ambulatory heart monitor.    Today:   Virtual switched to telephone due to internet drop out.   No new events.  Head spins at times especially if moving head from side to side.  Occasionally feels R foot doesn't  as well but walking OK, no falls, but because of dizziness has not tried to ride motorcycle.  Cut back on salt in diet and cut back on smoking, now down from >1 ppd to 1/2 ppd.  Completed heart monitor and returned, results pending.     Vascular risk factors- HTN, HPL- , with elevated TG in the past smoking, BMI >36. No DM- A1c 5.3%    Extensive review of notes in EMR, labs, tests, Interpretation of neuroimaging  CT negative, MRI DWI- small punctate infarcts in the right inferior cerebellum.  MRA- no LVO or significant stenosis in the intracranial and extracranial vessels, with L PICA and R AICA branches well visualized    TTE- EF 60-65%, normal LA, no bubble study.     Relevant ROS, Problem list, Past Medical/ Surgical/ Family/ Social history- reviewed and pertinent details noted in history.     Objective     Visit Vitals  Smoking Status Every Day       Virtual Neurological Exam:   Mental status: alert, interactive and cooperative with an appropriate affect.  Speech is clear.  Language intact for comprehension, expression, and vocabulary.  Fund of knowledge- adequate for current events and past history.     Assessment/Plan   1. Cerebrovascular accident (CVA) due to embolism of right cerebellar artery (CMS/HCC)    2. Primary hypertension    3. Dyslipidemia    4. Class 2 severe obesity due to excess calories with serious  "comorbidity and body mass index (BMI) of 36.0 to 36.9 in adult (CMS/[facility])    5. Tobacco use      Vascular risk factors- HTN, HPL- , with elevated TG in the past smoking, BMI >36. No DM- A1c 5.3%    PLAN Appearance of stroke consistent with embolism without identified cause, pending ambulatory heart monitor.   Refer to PT for imbalance, dizziness- Email- [email or web address]    Reviewed Goals for STROKE PREVENTION- recommendations to reduce the risk of vascular events and promote brain health include monitoring and management of vascular risk factors and lifestyle choices to goal values.     Cardiovascular disease- Goal is monitoring and management of conditions that increase stroke risk.   Antithrombotic \"blood thinner\" medication- Antithrombic Therapy/Blood Thinners : At goal- completed clopidogrel course can DC and continue aspirin lifelong for prevention unless ambulatory heart monitor suggests alternate is warranted.   Blood pressure- Normal BP is <120/80 mmHg.  Blood Pressure : Goal is normal 120/80 mmHg  Cholesterol- Ideal lipid profile is an LDL-cholesterol <70 mg/dl, total cholesterol <200 mg/dl, fasting triglycerides < 150 mg/dl and HDL-cholesterol >55 mg/dl.   Blood sugar- Blood Sugar : Goal is normal fasting sugar between  mg/dl   Healthy physical activity- Goal is a moderate level of exercise at least 30 minutes/day, most days of the week.   Healthy weight- Goal is an ideal weight with a waistline <40\" for men or <35\" for women, and BMI of 18.5-25.   Healthy diet- is rich in vegetables, fruits, whole grains, legumes and fish, low in salt, and avoids red meats and processed/ refined foods.   Healthy sleep- is restorative, ~7 hours/night, with identification and treatment of obstructive/ central sleep apnea that increases the risk of stroke and heart disease.   Smoking- Goal is to stop smoking any tobacco product and avoid second-hand smoke.  Reviewed several strategies and his goal to " continue to cut back until he is able to stop.   Alcohol- Goal is moderation; no more than 2 servings for men and 1 serving for non-pregnant women.   Drug use- Goal is avoidance of illicit drugs that can cause blood pressure spikes and damage to blood vessels.   Stroke Warning Signs- know the symptoms of stroke and the importance of calling 911/EMS to access the quickest treatment.     No orders of the defined types were placed in this encounter.

## 2024-03-01 LAB — BODY SURFACE AREA: 2.24 M2

## 2024-03-11 ENCOUNTER — TELEMEDICINE (OUTPATIENT)
Dept: PHARMACY | Facility: HOSPITAL | Age: 49
End: 2024-03-11
Payer: COMMERCIAL

## 2024-03-11 DIAGNOSIS — I61.9 CVA (CEREBROVASCULAR ACCIDENT DUE TO INTRACEREBRAL HEMORRHAGE) (MULTI): Primary | ICD-10-CM

## 2024-03-11 NOTE — ASSESSMENT & PLAN NOTE
Plan per neurology 2/29/24.  On ASA 81 mg daily, atorvastatin 40 mg daily.  SMBP well controlled on lisinopril 20 mg daily.  Medication questions answered.

## 2024-03-11 NOTE — PROGRESS NOTES
"Pharmacy Post-Discharge Visit  Tyrel Kruger \"Mele\" is a 48 y.o. male was referred to Clinical Pharmacy Team to complete a post-discharge medication optimization and monitoring visit.  The patient was referred for their Cerebrovascular Accident.    Admission Date: 24  Discharge Date: 24    Referring Provider: Fritz Mckeon MD    Subjective   No Known Allergies    [id number] - [place], [place] - [address]  [address]  [place] [address]  Phone: [phone] Fax: [phone]    [facility] - [place], [place] - [address] [facility]  [address] [address]  Phone: [phone] Fax: [phone]      Social History     Social History Narrative    Not on file        Notable Medication changes following discharge:  Start:   -Plavix x21 days  -aspirin 81 mg daily  -atorvastatin 40 mg daily  -pantoprazole 40 mg daily  Stop:   -omeprazole  Change: none    HPI  STROKE MANAGEMENT ASSESSMENT    Stroke Regimen:  -Cause of stroke: Non-Cardioembolic  -Stroke Meds:   -Anticoagulant: No   -Antiplatelet: Yes, describe: DAPT x 21 days with aspirin 81 mg and Plavix 75 mg, no on ASA 81 mg daily only  -    -Duration of therapy: indef  -The ASCVD Risk score (Clinton LOVELACE, et al., 2019) failed to calculate for the following reasons:    The patient has a prior MI or stroke diagnosis     HTN Assessment  -HTN diagnosis: yes  -There were no vitals filed for this visit.   -Current Regimen   -lisinopril 20 mg daily  -BP Cuff at home? yes  -HTN at goal? yes    HLD Assessment  -Current LDL: 85  -Current T  -Current Regimen   -atorvastatin 80 mg daily  -HLD at goal? no    Diabetes Mellitus Assessment:  -Diagnosis? no  -Current Regimen:   -n/a  -Home BG readings:   -n/a  -Last eye exam? N/a  -Last foot exam? N/a  -At goal? yes    Review of Systems    Medication System Management:  Affordability/Accessibility: none  Adherence/Organization: none  Adverse Effects: none    Objective "     There were no vitals taken for this visit.     LAB  Lab Results   Component Value Date    BILITOT 0.5 01/25/2024    CALCIUM 8.5 (L) 01/26/2024    CO2 25 01/26/2024     01/26/2024    CREATININE 0.68 01/26/2024    GLUCOSE 103 (H) 01/26/2024    ALKPHOS 56 01/25/2024    K 3.9 01/26/2024    PROT 7.0 01/25/2024     01/26/2024    AST 10 01/25/2024    ALT 15 01/25/2024    BUN 13 01/26/2024    ANIONGAP 12 01/26/2024    MG 2.01 08/26/2021    ALBUMIN 4.0 01/25/2024    GFRMALE >90 09/28/2023     Lab Results   Component Value Date    TRIG 101 01/26/2024    CHOL 137 01/26/2024    LDLCALC 85 01/26/2024    HDL 31.5 01/26/2024     Lab Results   Component Value Date    HGBA1C 5.3 01/26/2024         Current Outpatient Medications on File Prior to Visit   Medication Sig Dispense Refill    aspirin 81 mg chewable tablet Chew 1 tablet once daily. Do not start before January 27, 2024. 30 tablet 0    atorvastatin (Lipitor) 40 mg tablet Take 1 tablet (40 mg) by mouth once daily. 90 tablet 0    lisinopril 20 mg tablet Take 1 tablet (20 mg) by mouth once daily. 90 tablet 0    meclizine (Antivert) 25 mg tablet Take 1 tablet (25 mg) by mouth 3 times a day as needed for dizziness. 30 tablet 11    pantoprazole (ProtoNix) 40 mg EC tablet Take 1 tablet (40 mg) by mouth once daily in the morning. Take before meals. 90 tablet 0     No current facility-administered medications on file prior to visit.        HISTORICAL PHARMACOTHERAPY  -no relevant history    DRUG INTERACTIONS  - no known significant drug interactions requiring a change in therapy    Assessment/Plan   Problem List Items Addressed This Visit       CVA (cerebrovascular accident due to intracerebral hemorrhage) (CMS/[facility]) - Primary     Plan per neurology 2/29/24.  On ASA 81 mg daily, atorvastatin 40 mg daily.  SMBP well controlled on lisinopril 20 mg daily.  Medication questions answered.        Follow up: not needed      Continue all meds under the continuation of care with  the referring provider and clinical pharmacy team.    Angelo Lindquist PharmD     Verbal consent to manage patient's drug therapy was obtained from [the patient and/or an individual authorized to act on behalf of a patient]. They were informed they may decline to participate or withdraw from participation in pharmacy services at any time.

## 2024-03-12 DIAGNOSIS — R19.4 CHANGE IN BOWEL HABIT: ICD-10-CM

## 2024-03-12 DIAGNOSIS — D64.9 ANEMIA, UNSPECIFIED: Primary | ICD-10-CM

## 2024-03-12 DIAGNOSIS — K92.2 GASTROINTESTINAL HEMORRHAGE, UNSPECIFIED: ICD-10-CM

## 2024-03-16 ENCOUNTER — LAB (OUTPATIENT)
Dept: LAB | Facility: LAB | Age: 49
End: 2024-03-16
Payer: COMMERCIAL

## 2024-03-16 DIAGNOSIS — D64.9 ANEMIA, UNSPECIFIED: ICD-10-CM

## 2024-03-16 DIAGNOSIS — R79.89 ABNORMAL TSH: ICD-10-CM

## 2024-03-16 DIAGNOSIS — R19.4 CHANGE IN BOWEL HABIT: ICD-10-CM

## 2024-03-16 DIAGNOSIS — K92.2 GASTROINTESTINAL HEMORRHAGE, UNSPECIFIED: ICD-10-CM

## 2024-03-16 LAB
ALBUMIN SERPL BCP-MCNC: 4.3 G/DL (ref 3.4–5)
ALP SERPL-CCNC: 79 U/L (ref 33–120)
ALT SERPL W P-5'-P-CCNC: 16 U/L (ref 10–52)
ANION GAP SERPL CALC-SCNC: 12 MMOL/L (ref 10–20)
AST SERPL W P-5'-P-CCNC: 11 U/L (ref 9–39)
BILIRUB SERPL-MCNC: 0.6 MG/DL (ref 0–1.2)
BUN SERPL-MCNC: 15 MG/DL (ref 6–23)
CALCIUM SERPL-MCNC: 9.1 MG/DL (ref 8.6–10.3)
CHLORIDE SERPL-SCNC: 106 MMOL/L (ref 98–107)
CO2 SERPL-SCNC: 27 MMOL/L (ref 21–32)
CREAT SERPL-MCNC: 0.87 MG/DL (ref 0.5–1.3)
EGFRCR SERPLBLD CKD-EPI 2021: >90 ML/MIN/1.73M*2
ERYTHROCYTE [DISTWIDTH] IN BLOOD BY AUTOMATED COUNT: 12.8 % (ref 11.5–14.5)
FERRITIN SERPL-MCNC: 116 NG/ML (ref 20–300)
FOLATE SERPL-MCNC: 14.6 NG/ML
GLUCOSE SERPL-MCNC: 100 MG/DL (ref 74–99)
HCT VFR BLD AUTO: 48.3 % (ref 41–52)
HGB BLD-MCNC: 16.3 G/DL (ref 13.5–17.5)
IRON SATN MFR SERPL: 33 % (ref 25–45)
IRON SERPL-MCNC: 117 UG/DL (ref 35–150)
MAGNESIUM SERPL-MCNC: 1.8 MG/DL (ref 1.6–2.4)
MCH RBC QN AUTO: 31.8 PG (ref 26–34)
MCHC RBC AUTO-ENTMCNC: 33.7 G/DL (ref 32–36)
MCV RBC AUTO: 94 FL (ref 80–100)
NRBC BLD-RTO: 0 /100 WBCS (ref 0–0)
PLATELET # BLD AUTO: 261 X10*3/UL (ref 150–450)
POTASSIUM SERPL-SCNC: 4.3 MMOL/L (ref 3.5–5.3)
PROT SERPL-MCNC: 7 G/DL (ref 6.4–8.2)
RBC # BLD AUTO: 5.12 X10*6/UL (ref 4.5–5.9)
SODIUM SERPL-SCNC: 141 MMOL/L (ref 136–145)
TIBC SERPL-MCNC: 360 UG/DL (ref 240–445)
TSH SERPL-ACNC: 0.61 MIU/L (ref 0.44–3.98)
UIBC SERPL-MCNC: 243 UG/DL (ref 110–370)
VIT B12 SERPL-MCNC: 320 PG/ML (ref 211–911)
WBC # BLD AUTO: 7.8 X10*3/UL (ref 4.4–11.3)

## 2024-03-16 PROCEDURE — 80053 COMPREHEN METABOLIC PANEL: CPT

## 2024-03-16 PROCEDURE — 84443 ASSAY THYROID STIM HORMONE: CPT

## 2024-03-16 PROCEDURE — 83735 ASSAY OF MAGNESIUM: CPT

## 2024-03-16 PROCEDURE — 82728 ASSAY OF FERRITIN: CPT

## 2024-03-16 PROCEDURE — 36415 COLL VENOUS BLD VENIPUNCTURE: CPT

## 2024-03-16 PROCEDURE — 83540 ASSAY OF IRON: CPT

## 2024-03-16 PROCEDURE — 85027 COMPLETE CBC AUTOMATED: CPT

## 2024-03-16 PROCEDURE — 82746 ASSAY OF FOLIC ACID SERUM: CPT

## 2024-03-16 PROCEDURE — 83550 IRON BINDING TEST: CPT

## 2024-03-16 PROCEDURE — 82607 VITAMIN B-12: CPT

## 2024-03-20 ENCOUNTER — EVALUATION (OUTPATIENT)
Dept: PHYSICAL THERAPY | Facility: CLINIC | Age: 49
End: 2024-03-20
Payer: COMMERCIAL

## 2024-03-20 VITALS — DIASTOLIC BLOOD PRESSURE: 74 MMHG | HEART RATE: 77 BPM | SYSTOLIC BLOOD PRESSURE: 129 MMHG

## 2024-03-20 DIAGNOSIS — I63.441 CEREBROVASCULAR ACCIDENT (CVA) DUE TO EMBOLISM OF RIGHT CEREBELLAR ARTERY (MULTI): ICD-10-CM

## 2024-03-20 DIAGNOSIS — R26.9 ABNORMALITY OF GAIT AND MOBILITY: Primary | ICD-10-CM

## 2024-03-20 PROCEDURE — 97162 PT EVAL MOD COMPLEX 30 MIN: CPT | Mod: GP

## 2024-03-20 PROCEDURE — 97112 NEUROMUSCULAR REEDUCATION: CPT | Mod: GP

## 2024-03-20 ASSESSMENT — ENCOUNTER SYMPTOMS
LOSS OF SENSATION IN FEET: 0
OCCASIONAL FEELINGS OF UNSTEADINESS: 0
DEPRESSION: 0

## 2024-03-20 ASSESSMENT — PAIN - FUNCTIONAL ASSESSMENT: PAIN_FUNCTIONAL_ASSESSMENT: 0-10

## 2024-03-20 ASSESSMENT — PAIN SCALES - GENERAL: PAINLEVEL_OUTOF10: 0 - NO PAIN

## 2024-03-20 NOTE — PROGRESS NOTES
Physical Therapy    Physical Therapy Evaluation and Treatment    Patient Name: Tyrel Kruger  MRN: [id number]  Today's Date: 3/20/2024  Time Calculation  Start Time: 1600  Stop Time: 1700  Time Calculation (min): 60 min  PT Evaluation Time Entry  PT Evaluation (Moderate) Time Entry: 45  PT Therapeutic Procedures Time Entry  Neuromuscular Re-Education Time Entry: 15        Insurance: [employer] [place]    Plan of Care: 3/20/24-6/18/24  Visit Number: 1     Assessment:     Tyrel Kruger  is a 48 y.o. 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 Pt presents with signs and symptoms consistent with room spinning dizziness d/t late effects of R cerebellar infarct and mild unilateral hypofunction. his impairments include: mild balance deficits and persistent room spinning dizziness with stress/anxiety, walking down busy aisles at grocery store, looking up, turning head.  Due to these impairments, he has the following functional limitations and participation restrictions: working as , riding motorcycle, and hunting.  Skilled physical therapy services are appropriate and beneficial in order to achieve measurable and meaningful change in the objective tests and measures. Utilization of skilled physical therapy services will aid in advancing his functional status and attaining his therapy-related goals. The patient verbalized understanding and is in agreement with all goals and plan of care. Plan of care was developed with input and agreement by the patient    Plan:   OP PT Plan  Treatment/Interventions: Biofeedback, Canalith repositioning, Dry needling, Education/ Instruction, Electrical stimulation, Gait training, Manual therapy, Neuromuscular re-education, Self care/ home management, Therapeutic activities, Therapeutic exercises  PT Plan: Skilled PT  PT Frequency: 1 time per week  Duration: 6 visits  Onset Date:  01/25/24  Certification Period Start Date: 03/20/24  Certification Period End Date: 06/18/24  Rehab Potential: Good  Plan of Care Agreement: Patient    Current Problem:  1. Abnormality of gait and mobility  Follow Up In Physical Therapy      2. Cerebrovascular accident (CVA) due to embolism of right cerebellar artery (CMS/HCC)  Referral to Physical Therapy    Follow Up In Physical Therapy            SUBJECTIVE  Subjective   Tyrel Kruger  is a 48 y.o. male  presenting to the clinic with chief complaint of residual dizziness status post stroke 1/25/24. He reports bilateral hand falling asleep with prolonged positioning. He reports L foot drag/heel scuffing with walking when not paying attention to walking. He reports that he still gets nystagmus with eye testing but her does not feel dizziness with it. No dizziness with rolling in bed, turning head, or head movements.    Dizziness Scale: 2/10 on average  Onset date: 1/24/24  Type of dizziness: Room spinning  How long does the dizziness last:  3-4 seconds   What causes the dizziness: Stress/Anxiety    SCREENING:  -Hydration status: 24oz/ day  -Sleep: 5hrs/ night  -Numbness/tingling: No  -Headache/dizziness: Yes    PREVIOUS LEVEL OF FUNCTION:  Independent Living, Independent ADLs/IADLs, Independent Ambulation, (+) Driving, Work:    CURRENT FUNCTIONAL LIMITATIONS: Riding motorcycle  CURRENT DME: none      SOCIAL HISTORY/LIVING ARRANGEMENT:   Patient lives with girlfriend) in a 3-story home with 3 RAÚL without HR  Patient with 12 steps to bedroom/bathroom with HR    PARTICIPATION RESTRICTIONS: hunting, driving motorcycle, Work (positional changes)  EXERCISE/ACTIVITY LEVEL: 30 min walk every other day    PATIENT GOAL: Learn tricks to get the dizziness away    General:  General  Reason for Referral: CVA  Referred By: Dr. Piedra  Past Medical History Relevant to Rehab: HTN  Preferred Learning Style: auditory, verbal, visual, written  Payor: [facility] /  Plan: [facility] / Product Type: *No Product type* /   Sila,Ami A    Precautions  Precautions  STEADI Fall Risk Score (The score of 4 or more indicates an increased risk of falling): 1  Heart Rate: 77  Heart Rate Source: Monitor  BP: 129/74  BP Location: Right arm  BP Method: Automatic  Patient Position: Standing    Pain  Pain Assessment: 0-10  Pain Score: 0 - No pain    Vital Signs:   Vital Signs  Heart Rate: 77  Heart Rate Source: Monitor  BP: 129/74  BP Location: Right arm  BP Method: Automatic  Patient Position: Standing     Objective   Objective     MUSCULOSKELETAL SYSTEM:      GROSS SYMMETRY:  stand/sitting: rounded shoulders,       GROSS RANGE OF MOTION: WFL      GROSS STRENGTH: WFL  Muscle Right LE Left LE   Hip Flexion (L1-L2) 4+/5 4/5   Hip Abduction 4/5 4/5   Hip Adduction 4/5 4/5   Knee Flexion (L3) 4+/5 4+/5   Knee Extension (L4) 4+/5 4+/5   Ankle Dorsiflexion (L5) 4+/5 4+/5   Ankle Plantarflexion (S1) 5/5 5/5     NEUROMUSCULAR SYSTEM:      GROSS SENSATION: WFL        TONE: WFL     COORDINATION:    CHIQUITA:  hypometria L   HTS:   impaired L LE     PROPRIOCEPTION:  Intact R/L 3/3 ankle     TRANSFERS:       SIT <> STAND: Independent       BALANCE:    SITTING:  Good   STANDING:  Good        GAIT:  Patient ambulated 45 feet with no device at Indep. Patient demonstrated good jose david, step length, stride length, heel strike, and toe off      STAIRS: up/down 3 steps with alt pattern and no HR     Vestibular Function Tests:     Nystagmus: not visible in room light this date     Saccades:  Normal     Smooth Pursuit:  normal      Convergence: normal     VOR- Head Thrust: Positive L       Outcome Measures:  FGA - Functional Gait Assessment  Gait level surface: 3  Change in gait speed: 3  Gait with horizontal head turns: 3  Gait with vertical head turns: 3  Gait and pivot turn: 2  Step over obstacle: 3  Gait with narrow base of support: 3  Gait with eyes closed: 2  Ambulating backwards: 3  Steps:  3  FGA Total Score: 28    Other Measures  5x Sit to Stand: 7.6 sec  10M Walk Test: 8.5 sec  Activities - Specific Balance Confidence Scale: 98.7%  Dizziness Handicap Inventory: 12/100        Treatments:  NEUROMUSCULAR REEDUCATION x 15 minutes  Educated patient on purpose and benefits of neurologic physical therapy   Educated patient on purpose of exercises and importance of regular daily home program compliance  Educated patient on importance of nutrition and hydration to prevent dizziness and the recommended daily amount being half of one's body weight in ounces  Educated patient on multi-factorial nature of balance systems to maintain balance  Educated on the role of inactivity in decreased endurance and decreased cardiovascular fitness  Educated patient on orthostatic hypotension and method to improve blood flow such as heel-toe raises and marches; bending down and raising slowly  Educated patient on relationship of cerebellum on balance and dizziness  Educated patient on impact of stress and anxiety on dizziness  Educated on diaphragmatic breathing wale during dizziness episodes  Discussed PPPD as pt with prolonged dizziness following stroke in January 2024  Provided patient with visual, verbal, and written instruction via printed home program handout to ensure carryover   Optokinetic Training of Busy Grocery Store x 3 min  Seated VORx 1 at ~100bpm x 30 sec  Standing 3-way Hip with green TB at ankles x 5 ea  Lateral stepping with green TB at ankles x 5 feet  Reviewed Corner Balance exercises, pt expressed understanding    EDUCATION:     Access Code: [email or web address]  URL: [email or web address]  Date: 03/20/2024  Prepared by: Bozena Riley    Exercises  - Seated Gaze Stabilization with Head Rotation  - 1 x daily - 7 x weekly - 3 sets - 3-12 minutes  - Tandem Stance in Corner  - 1 x daily - 7 x weekly - 3 sets - 10 reps  - Tandem Stance with Eyes Closed in Corner  - 1 x daily - 7 x weekly - 3 sets - 10 reps  -  Semi-Tandem Corner Balance: Eyes Open With Head Turns  - 1 x daily - 7 x weekly - 3 sets - 10 reps  - Side Stepping with Resistance at Ankles and Counter Support  - 1 x daily - 7 x weekly - 3 sets - 10 reps  - Standing Hip Flexion with Counter Support  - 1 x daily - 7 x weekly - 3 sets - 10 reps  - Standing Hip Abduction with Counter Support  - 1 x daily - 7 x weekly - 3 sets - 10 reps  - Standing Hip Extension with Counter Support  - 1 x daily - 7 x weekly - 3 sets - 10 reps      Goals:  Active       PT Problem       PT Goal 1       Start:  03/20/24    Expected End:  06/18/24       Patient will be Independent with home exercise program to demonstrate compliance and self-management         PT Goal 2       Start:  03/20/24    Expected End:  06/18/24       Patient will score 8/100 on DHI to demonstrate improvement in dizziness symptoms and functional mobility         PT Goal 3       Start:  03/20/24    Expected End:  06/18/24       Patient will improve pivot turn and ambulation with eyes closed from FGA to demonstrate improved dynamic balance with gait         PT Goal 4       Start:  03/20/24    Expected End:  06/18/24       Patient will report 0/10 dizziness at baseline         Patient Stated Goal 1       Start:  03/20/24    Expected End:  06/18/24       Learn tricks to get the dizziness away

## 2024-03-27 ENCOUNTER — OFFICE (OUTPATIENT)
Dept: URBAN - METROPOLITAN AREA CLINIC 26 | Facility: CLINIC | Age: 49
End: 2024-03-27
Payer: COMMERCIAL

## 2024-03-27 VITALS
TEMPERATURE: 98.3 F | SYSTOLIC BLOOD PRESSURE: 124 MMHG | DIASTOLIC BLOOD PRESSURE: 78 MMHG | WEIGHT: 228 LBS | HEIGHT: 68 IN | HEART RATE: 91 BPM

## 2024-03-27 DIAGNOSIS — Z80.0 FAMILY HISTORY OF MALIGNANT NEOPLASM OF DIGESTIVE ORGANS: ICD-10-CM

## 2024-03-27 DIAGNOSIS — K64.9 UNSPECIFIED HEMORRHOIDS: ICD-10-CM

## 2024-03-27 DIAGNOSIS — K62.5 HEMORRHAGE OF ANUS AND RECTUM: ICD-10-CM

## 2024-03-27 DIAGNOSIS — K21.9 GASTRO-ESOPHAGEAL REFLUX DISEASE WITHOUT ESOPHAGITIS: ICD-10-CM

## 2024-03-27 DIAGNOSIS — Z09 ENCOUNTER FOR FOLLOW-UP EXAMINATION AFTER COMPLETED TREATMEN: ICD-10-CM

## 2024-03-27 DIAGNOSIS — Z86.010 PERSONAL HISTORY OF COLONIC POLYPS: ICD-10-CM

## 2024-03-27 PROCEDURE — 99214 OFFICE O/P EST MOD 30 MIN: CPT | Performed by: NURSE PRACTITIONER

## 2024-03-27 RX ORDER — PANTOPRAZOLE 40 MG/1
TABLET, DELAYED RELEASE ORAL
Qty: 90 | Refills: 0 | Status: COMPLETED
End: 2024-03-27

## 2024-04-03 ENCOUNTER — PATIENT OUTREACH (OUTPATIENT)
Dept: CARE COORDINATION | Facility: CLINIC | Age: 49
End: 2024-04-03
Payer: COMMERCIAL

## 2024-04-03 NOTE — PROGRESS NOTES
Outreach call to patient to check in 30 days after hospital discharge to support smooth transition of care.  Patient with no additional needs noted. No additional outreach needed at this time.     JUSTEN KernsN, RN

## 2024-05-01 DIAGNOSIS — K21.9 GASTROESOPHAGEAL REFLUX DISEASE WITHOUT ESOPHAGITIS: ICD-10-CM

## 2024-05-01 DIAGNOSIS — I10 PRIMARY HYPERTENSION: ICD-10-CM

## 2024-05-01 DIAGNOSIS — I61.9 CVA (CEREBROVASCULAR ACCIDENT DUE TO INTRACEREBRAL HEMORRHAGE) (MULTI): ICD-10-CM

## 2024-05-01 RX ORDER — LISINOPRIL 20 MG/1
20 TABLET ORAL DAILY
Qty: 90 TABLET | Refills: 0 | Status: SHIPPED | OUTPATIENT
Start: 2024-05-01

## 2024-05-01 RX ORDER — ATORVASTATIN CALCIUM 40 MG/1
40 TABLET, FILM COATED ORAL DAILY
Qty: 90 TABLET | Refills: 0 | Status: SHIPPED | OUTPATIENT
Start: 2024-05-01

## 2024-05-01 RX ORDER — PANTOPRAZOLE SODIUM 40 MG/1
40 TABLET, DELAYED RELEASE ORAL
Qty: 90 TABLET | Refills: 0 | Status: SHIPPED | OUTPATIENT
Start: 2024-05-01

## 2024-05-14 ENCOUNTER — OFFICE VISIT (OUTPATIENT)
Dept: PRIMARY CARE | Facility: CLINIC | Age: 49
End: 2024-05-14
Payer: COMMERCIAL

## 2024-05-14 VITALS
RESPIRATION RATE: 16 BRPM | HEART RATE: 72 BPM | WEIGHT: 232 LBS | HEIGHT: 67 IN | SYSTOLIC BLOOD PRESSURE: 120 MMHG | BODY MASS INDEX: 36.41 KG/M2 | DIASTOLIC BLOOD PRESSURE: 75 MMHG

## 2024-05-14 DIAGNOSIS — E78.5 DYSLIPIDEMIA: ICD-10-CM

## 2024-05-14 DIAGNOSIS — R42 DIZZINESS: ICD-10-CM

## 2024-05-14 DIAGNOSIS — I10 PRIMARY HYPERTENSION: ICD-10-CM

## 2024-05-14 DIAGNOSIS — K21.9 GASTROESOPHAGEAL REFLUX DISEASE WITHOUT ESOPHAGITIS: ICD-10-CM

## 2024-05-14 DIAGNOSIS — I63.441 CEREBROVASCULAR ACCIDENT (CVA) DUE TO EMBOLISM OF RIGHT CEREBELLAR ARTERY (MULTI): Primary | ICD-10-CM

## 2024-05-14 PROCEDURE — 99213 OFFICE O/P EST LOW 20 MIN: CPT | Performed by: INTERNAL MEDICINE

## 2024-05-14 PROCEDURE — 3078F DIAST BP <80 MM HG: CPT | Performed by: INTERNAL MEDICINE

## 2024-05-14 PROCEDURE — 3074F SYST BP LT 130 MM HG: CPT | Performed by: INTERNAL MEDICINE

## 2024-05-14 PROCEDURE — 3008F BODY MASS INDEX DOCD: CPT | Performed by: INTERNAL MEDICINE

## 2024-05-14 RX ORDER — PROMETHAZINE HYDROCHLORIDE 25 MG/1
25 TABLET ORAL EVERY 6 HOURS PRN
COMMUNITY
Start: 2024-03-27 | End: 2024-05-14 | Stop reason: ALTCHOICE

## 2024-05-14 ASSESSMENT — ENCOUNTER SYMPTOMS
CARDIOVASCULAR NEGATIVE: 1
NEUROLOGICAL NEGATIVE: 1
PSYCHIATRIC NEGATIVE: 1
RESPIRATORY NEGATIVE: 1
MUSCULOSKELETAL NEGATIVE: 1
GASTROINTESTINAL NEGATIVE: 1
EYES NEGATIVE: 1
CONSTITUTIONAL NEGATIVE: 1
HEMATOLOGIC/LYMPHATIC NEGATIVE: 1
ENDOCRINE NEGATIVE: 1
ALLERGIC/IMMUNOLOGIC NEGATIVE: 1

## 2024-05-14 NOTE — PROGRESS NOTES
"Subjective   Patient ID: Mele Kruger is a 48 y.o. male who presents for Follow-up (3 month follow up).    HPI     Review of Systems   Constitutional: Negative.    HENT: Negative.     Eyes: Negative.    Respiratory: Negative.     Cardiovascular: Negative.    Gastrointestinal: Negative.    Endocrine: Negative.    Musculoskeletal: Negative.    Skin: Negative.    Allergic/Immunologic: Negative.    Neurological: Negative.    Hematological: Negative.    Psychiatric/Behavioral: Negative.     All other systems reviewed and are negative.      Objective   Ht 1.702 m (5' 7\")   Wt 105 kg (232 lb)   BMI 36.34 kg/m²   Blood pressure 120/75, pulse 72, resp. rate 16, height 1.702 m (5' 7\"), weight 105 kg (232 lb).   Physical Exam  Vitals and nursing note reviewed.   Constitutional:       Appearance: Normal appearance.   HENT:      Head: Normocephalic and atraumatic.      Right Ear: Tympanic membrane, ear canal and external ear normal.      Left Ear: Tympanic membrane, ear canal and external ear normal. There is no impacted cerumen.      Nose: Nose normal.      Mouth/Throat:      Mouth: Mucous membranes are moist.      Pharynx: Oropharynx is clear.   Eyes:      Extraocular Movements: Extraocular movements intact.      Conjunctiva/sclera: Conjunctivae normal.      Pupils: Pupils are equal, round, and reactive to light.   Cardiovascular:      Rate and Rhythm: Normal rate and regular rhythm.      Pulses: Normal pulses.      Heart sounds: Normal heart sounds. No murmur heard.  Pulmonary:      Effort: Pulmonary effort is normal. No respiratory distress.      Breath sounds: Normal breath sounds. No stridor. No wheezing, rhonchi or rales.   Chest:      Chest wall: No tenderness.   Abdominal:      General: Abdomen is flat. Bowel sounds are normal. There is no distension.      Palpations: Abdomen is soft. There is no mass.      Tenderness: There is no abdominal tenderness. There is no right CVA tenderness, left CVA tenderness, guarding or " rebound.      Hernia: No hernia is present.   Musculoskeletal:         General: Normal range of motion.      Cervical back: Normal range of motion and neck supple.   Skin:     General: Skin is warm.      Capillary Refill: Capillary refill takes less than 2 seconds.   Neurological:      General: No focal deficit present.      Mental Status: He is alert.      Cranial Nerves: No cranial nerve deficit.      Sensory: No sensory deficit.      Motor: No weakness.      Coordination: Coordination normal.      Gait: Gait normal.      Deep Tendon Reflexes: Reflexes normal.   Psychiatric:         Mood and Affect: Mood normal.         Behavior: Behavior normal. Behavior is cooperative.         Thought Content: Thought content normal.         Judgment: Judgment normal.         Assessment/Plan   Problem List Items Addressed This Visit             ICD-10-CM    Primary hypertension I10     HTN - hypertension well/controlled .Target BP < 130/80  achieved. Educate low salt diet and exercise with weight loss. Educate home self monitoring and diary keeping. Educate risks of elevate blood pressure and benefits of prompt treatment.  Refill lisinopril          GERD (gastroesophageal reflux disease) K21.9     GERD - Acid reflux disease. Rx. PPI (Prilosec/Prevacid/Protonix/Nexium) and educate diet and life style changes. Referred patient to an endoscopy (EGD) and check H. Pylori titers.          Dizziness R42     Intermittent and on Meclizine PRN - and doing exercises and PT  -          Cerebrovascular accident (CVA) due to embolism of right cerebellar artery (Multi) - Primary I63.441     Still dizzy but improved and on Meclizine it has improved and doing PT            Dyslipidemia E78.5       Primary hypertension I10      Relevant Medications     lisinopril 20 mg tablet     GERD (gastroesophageal reflux disease) K21.9     CVA (cerebrovascular accident due to intracerebral hemorrhage) (CMS/[facility]) I61.9       Stroke prevention by adding the  Plavix and aspirin and Atorvastatin   and educate extensively tobacco cessation - and control cholesterol and blood pressure and antiplatelet therapy reviewed with the patient the MRA of the brain            Relevant Medications     clopidogrel (Plavix) 75 mg tablet     atorvastatin (Lipitor) 40 mg tablet     Dizziness R42       Intermittent and on Meclizine PRN -            Malaise and fatigue - Primary R53.81, R53.83             Hypogonadism in male E29.1          Monitor levles           Primary hypertension - Primary I10        HTN - hypertension well/controlled .Target BP < 130/80  achieved. Educate low salt diet and exercise with weight loss. Educate home self monitoring and diary keeping. Educate risks of elevate blood pressure and benefits of prompt treatment.  Refill lisinopril           GERD (gastroesophageal reflux disease) K21.9        GERD - Acid reflux disease. Rx. PPI (Prilosec/Prevacid/Protonix/Nexium) and educate diet and life style changes. Referred patient to an endoscopy (EGD) and check H. Pylori titers.                HTN - hypertension well/controlled.Target BP < 130/80 well/not achieved. Educate low salt diet and exercise with weight loss. Educate home self monitoring and diary keeping. Educate risks of elevate blood pressure and benefits of prompt treatment..start Lisinopril/Hct 10/12.5 mg daily      Dizziness - monitor blood pressure      Fatigue - check CMP(metabolic panel and elctrolytes) , CBC(complete blood cell count), TSH(thyroid function). Insomnia may play a role and sleep studies(rule out sleep apnea) are recommended. Educate sleep hygiene. Consider anxiety disorder vs. depression. Consider Stress test, and 2DECHO.      GERD - Continues the proton pump inhibitors in (PPI) in the form of /Pantoprazole 40 mg daily and educate change in life style and educate diet - Educate extensively diet and elevate the head of the bed at night - at HS = refer to GI for EGD - last upper endoscopy  ??? refer back to GI for upper endoscopy -                 Immunizations/Injections       very important  Immunizations from outside sources need reconciliation.       Pfizer COVID-19 vaccine, bivalent, age 12 years and older (30 mcg/0.3 mL)9/23/2022  Pfizer Purple Cap SARS-CoV-24/12/2021, 3/22/2021                Immunizations/Injections      very important  Immunizations from outside sources need reconciliation.      Pfizer COVID-19 vaccine, bivalent, age 12 years and older (30 m

## 2024-05-14 NOTE — ASSESSMENT & PLAN NOTE
GERD - Acid reflux disease. Rx. PPI (Prilosec/Prevacid/Protonix/Nexium) and educate diet and life style changes. Referred patient to an endoscopy (EGD) and check H. Pylori titers.    18-Nov-2023 12:46

## 2024-06-13 ENCOUNTER — APPOINTMENT (OUTPATIENT)
Dept: PRIMARY CARE | Facility: CLINIC | Age: 49
End: 2024-06-13
Payer: COMMERCIAL

## 2024-06-14 ENCOUNTER — APPOINTMENT (OUTPATIENT)
Dept: NEUROLOGY | Facility: CLINIC | Age: 49
End: 2024-06-14
Payer: COMMERCIAL

## 2024-07-11 ENCOUNTER — DOCUMENTATION (OUTPATIENT)
Dept: PHYSICAL THERAPY | Facility: CLINIC | Age: 49
End: 2024-07-11
Payer: COMMERCIAL

## 2024-07-11 NOTE — PROGRESS NOTES
"Physical Therapy    Discharge Summary    Name: Tyrel Kruger \"Mele\"  MRN: [id number]  : 1975  Date: 24    Discharge Summary: PT    Discharge Information: Date of discharge 24, Date of evaluation 3/20/24, Number of attended visits Evaluation only, Referred by Dr. Piedra, and Referred for s/p CVA    Therapy Summary: Patient attended PT evaluation and treatment session only. He failed to make follow-up OP PT appointments.    Discharge Status: Discharge from OP PT     Rehab Discharge Reason: Failed to schedule and/or keep follow-up appointment(s)  "

## 2024-07-30 DIAGNOSIS — I10 PRIMARY HYPERTENSION: ICD-10-CM

## 2024-07-30 DIAGNOSIS — I61.9 CVA (CEREBROVASCULAR ACCIDENT DUE TO INTRACEREBRAL HEMORRHAGE) (MULTI): ICD-10-CM

## 2024-07-30 DIAGNOSIS — K21.9 GASTROESOPHAGEAL REFLUX DISEASE WITHOUT ESOPHAGITIS: ICD-10-CM

## 2024-07-30 RX ORDER — LISINOPRIL 20 MG/1
20 TABLET ORAL DAILY
Qty: 90 TABLET | Refills: 0 | Status: SHIPPED | OUTPATIENT
Start: 2024-07-30

## 2024-07-30 RX ORDER — PANTOPRAZOLE SODIUM 40 MG/1
40 TABLET, DELAYED RELEASE ORAL
Qty: 90 TABLET | Refills: 0 | Status: SHIPPED | OUTPATIENT
Start: 2024-07-30

## 2024-07-30 RX ORDER — ATORVASTATIN CALCIUM 40 MG/1
40 TABLET, FILM COATED ORAL DAILY
Qty: 90 TABLET | Refills: 0 | Status: SHIPPED | OUTPATIENT
Start: 2024-07-30

## 2024-08-20 ENCOUNTER — APPOINTMENT (OUTPATIENT)
Dept: PRIMARY CARE | Facility: CLINIC | Age: 49
End: 2024-08-20
Payer: COMMERCIAL

## 2024-08-20 VITALS
WEIGHT: 221 LBS | HEIGHT: 67 IN | HEART RATE: 78 BPM | BODY MASS INDEX: 34.69 KG/M2 | OXYGEN SATURATION: 98 % | SYSTOLIC BLOOD PRESSURE: 120 MMHG | RESPIRATION RATE: 17 BRPM | DIASTOLIC BLOOD PRESSURE: 70 MMHG

## 2024-08-20 DIAGNOSIS — E29.1 HYPOGONADISM IN MALE: ICD-10-CM

## 2024-08-20 DIAGNOSIS — I10 PRIMARY HYPERTENSION: ICD-10-CM

## 2024-08-20 DIAGNOSIS — E78.5 DYSLIPIDEMIA: ICD-10-CM

## 2024-08-20 DIAGNOSIS — Z72.0 TOBACCO USE: ICD-10-CM

## 2024-08-20 DIAGNOSIS — K21.9 GASTROESOPHAGEAL REFLUX DISEASE WITHOUT ESOPHAGITIS: ICD-10-CM

## 2024-08-20 DIAGNOSIS — Z00.00 ANNUAL PHYSICAL EXAM: Primary | ICD-10-CM

## 2024-08-20 PROCEDURE — 3078F DIAST BP <80 MM HG: CPT | Performed by: INTERNAL MEDICINE

## 2024-08-20 PROCEDURE — 3074F SYST BP LT 130 MM HG: CPT | Performed by: INTERNAL MEDICINE

## 2024-08-20 PROCEDURE — 3008F BODY MASS INDEX DOCD: CPT | Performed by: INTERNAL MEDICINE

## 2024-08-20 PROCEDURE — 99396 PREV VISIT EST AGE 40-64: CPT | Performed by: INTERNAL MEDICINE

## 2024-08-20 ASSESSMENT — ENCOUNTER SYMPTOMS
HEMATOLOGIC/LYMPHATIC NEGATIVE: 1
PSYCHIATRIC NEGATIVE: 1
RESPIRATORY NEGATIVE: 1
GASTROINTESTINAL NEGATIVE: 1
EYES NEGATIVE: 1
NEUROLOGICAL NEGATIVE: 1
MUSCULOSKELETAL NEGATIVE: 1
ALLERGIC/IMMUNOLOGIC NEGATIVE: 1
ENDOCRINE NEGATIVE: 1
CONSTITUTIONAL NEGATIVE: 1
CARDIOVASCULAR NEGATIVE: 1

## 2024-08-20 NOTE — ASSESSMENT & PLAN NOTE
Hypercholesterolemia - Monitor lipid profile and educate patient upon risks of high cholesterol and targets. Educate diet and change in lifestyle and increase in exercises - Refill: Atorvastatin   40 mg daily  and educate compliance with medication and diet.

## 2024-08-20 NOTE — ASSESSMENT & PLAN NOTE
No recent hospitalizations.    All medications reviewed and reconciled by me the provider..  No use of controlled substances or opiates.    Family history, social history reviewed, no changes.    Patient does not smoke.    Patient does not drink.    Patient hydrates adequately daily.  Eats a well-balanced healthy diet.     Exercises adequately including walking and doing weightbearing exercises.    Patient denies any difficulty with memory or cognition.     Denies any difficulty with hearing.  Patient does not wear hearing aids.    No fall risk.  No recent falls.  Denies any difficulty walking.    Patient with no history of depression anxiety, denies any loss of interest, no feeling of sadness, no lack of motivation.    Patient is independent in all ADLs and IADLs.  Independent bathing, dressing, walking.  Takes care of own finances, shopping and cooking.     End-of-life decision-making power of  reviewed with patient.     Risk Factors Identified During Visit: None.   Influenza: influenza vaccine was previously given.   Pneumovax 23: Pneumovax 23 vaccine was previously given.   Prevnar 13: Prevnar 13 vaccine was previously given.   Shingles Vaccine: Shingles vaccine was previously given.   Prostate cancer screening: Screening is current.   Colorectal Cancer Screening: screening is current.   Abdominal Aortic Aneurysm screening: screening is current.   HIV screening: screening not indicated.       Preventive measures - Recommend ASAP :  Colonoscopy every 5 years and he will have another one in October 2024 (educate patient risks of colon cancer) refer patient to GI specialist. Ophthalmology and retina exam recommend yearly exams refer patient to an Ophthalmologist. BPH - (Benign Prostatic Hypertrophy) refer patient to an Urologist for rectal exam and PSA check.

## 2024-08-20 NOTE — PROGRESS NOTES
"Subjective   Patient ID: Mele Kruger is a 48 y.o. male who presents for Annual Exam (cpe). Dizziness and taking Meclizine PRN     HPI     Review of Systems   Constitutional: Negative.    HENT: Negative.     Eyes: Negative.    Respiratory: Negative.     Cardiovascular: Negative.    Gastrointestinal: Negative.    Endocrine: Negative.    Musculoskeletal: Negative.    Skin: Negative.    Allergic/Immunologic: Negative.    Neurological: Negative.    Hematological: Negative.    Psychiatric/Behavioral: Negative.     All other systems reviewed and are negative.      Objective   Pulse 78   Resp 17   Ht 1.702 m (5' 7\")   Wt 100 kg (221 lb)   SpO2 98%   BMI 34.61 kg/m²   Blood pressure 120/70, pulse 78, resp. rate 17, height 1.702 m (5' 7\"), weight 100 kg (221 lb), SpO2 98%.   Physical Exam  Vitals and nursing note reviewed.   Constitutional:       Appearance: Normal appearance.   HENT:      Head: Normocephalic and atraumatic.      Right Ear: Tympanic membrane, ear canal and external ear normal.      Left Ear: Tympanic membrane, ear canal and external ear normal. There is no impacted cerumen.      Nose: Nose normal.      Mouth/Throat:      Mouth: Mucous membranes are moist.      Pharynx: Oropharynx is clear.   Eyes:      Extraocular Movements: Extraocular movements intact.      Conjunctiva/sclera: Conjunctivae normal.      Pupils: Pupils are equal, round, and reactive to light.   Cardiovascular:      Rate and Rhythm: Normal rate and regular rhythm.      Pulses: Normal pulses.      Heart sounds: Normal heart sounds. No murmur heard.  Pulmonary:      Effort: Pulmonary effort is normal. No respiratory distress.      Breath sounds: Normal breath sounds. No stridor. No wheezing, rhonchi or rales.   Chest:      Chest wall: No tenderness.   Abdominal:      General: Abdomen is flat. Bowel sounds are normal. There is no distension.      Palpations: Abdomen is soft. There is no mass.      Tenderness: There is no abdominal tenderness. " There is no right CVA tenderness, left CVA tenderness, guarding or rebound.      Hernia: No hernia is present.   Musculoskeletal:         General: Normal range of motion.      Cervical back: Normal range of motion and neck supple.   Skin:     General: Skin is warm.      Capillary Refill: Capillary refill takes less than 2 seconds.   Neurological:      General: No focal deficit present.      Mental Status: He is alert.      Cranial Nerves: No cranial nerve deficit.      Sensory: No sensory deficit.      Motor: No weakness.      Coordination: Coordination normal.      Gait: Gait normal.      Deep Tendon Reflexes: Reflexes normal.   Psychiatric:         Mood and Affect: Mood normal.         Behavior: Behavior normal. Behavior is cooperative.         Thought Content: Thought content normal.         Judgment: Judgment normal.         Assessment/Plan   Problem List Items Addressed This Visit             ICD-10-CM    Hypogonadism in male E29.1     Monitor Testosterone levels          Primary hypertension I10     HTN - hypertension well/controlled .Target BP < 130/80 achieved. Educate low salt diet and exercise with weight loss. Educate home self monitoring and diary keeping. Educate risks of elevate blood pressure and benefits of prompt treatment. Refill lisinopril          GERD (gastroesophageal reflux disease) K21.9     GERD - Acid reflux disease. Rx. PPI (Prilosec/Prevacid/Protonix/Nexium) and educate diet and life style changes. Referred patient to an endoscopy (EGD) and check H. Pylori titers.          Dyslipidemia E78.5     Hypercholesterolemia - Monitor lipid profile and educate patient upon risks of high cholesterol and targets. Educate diet and change in lifestyle and increase in exercises - Refill: Atorvastatin   40 mg daily  and educate compliance with medication and diet.           Tobacco use Z72.0     Tobacco cessation - Educate risks of smoking (CAD/COPD/CANCER of the lung or urinary bladder/CVA). Educate  life style changes and prescribe nicotine patch and Wellbutrin 150mg BID. Educate stress reduction.                                                                                      Relevant Orders    CT lung screening low dose    Annual physical exam - Primary Z00.00     No recent hospitalizations.    All medications reviewed and reconciled by me the provider..  No use of controlled substances or opiates.    Family history, social history reviewed, no changes.    Patient does not smoke.    Patient does not drink.    Patient hydrates adequately daily.  Eats a well-balanced healthy diet.     Exercises adequately including walking and doing weightbearing exercises.    Patient denies any difficulty with memory or cognition.     Denies any difficulty with hearing.  Patient does not wear hearing aids.    No fall risk.  No recent falls.  Denies any difficulty walking.    Patient with no history of depression anxiety, denies any loss of interest, no feeling of sadness, no lack of motivation.    Patient is independent in all ADLs and IADLs.  Independent bathing, dressing, walking.  Takes care of own finances, shopping and cooking.     End-of-life decision-making power of  reviewed with patient.     Risk Factors Identified During Visit: None.   Influenza: influenza vaccine was previously given.   Pneumovax 23: Pneumovax 23 vaccine was previously given.   Prevnar 13: Prevnar 13 vaccine was previously given.   Shingles Vaccine: Shingles vaccine was previously given.   Prostate cancer screening: Screening is current.   Colorectal Cancer Screening: screening is current.   Abdominal Aortic Aneurysm screening: screening is current.   HIV screening: screening not indicated.       Preventive measures - Recommend ASAP :  Colonoscopy every 5 years and he will have another one in October 2024 (educate patient risks of colon cancer) refer patient to GI specialist. Ophthalmology and retina exam recommend yearly exams refer  patient to an Ophthalmologist. BPH - (Benign Prostatic Hypertrophy) refer patient to an Urologist for rectal exam and PSA check.          Relevant Orders    CBC and Auto Differential    Comprehensive Metabolic Panel    Lipid Panel    TSH with reflex to Free T4 if abnormal    Prostate Specific Antigen       Primary hypertension I10        HTN - hypertension well/controlled .Target BP < 130/80  achieved. Educate low salt diet and exercise with weight loss. Educate home self monitoring and diary keeping. Educate risks of elevate blood pressure and benefits of prompt treatment.  Refill lisinopril            GERD (gastroesophageal reflux disease) K21.9       GERD - Acid reflux disease. Rx. PPI (Prilosec/Prevacid/Protonix/Nexium) and educate diet and life style changes. Referred patient to an endoscopy (EGD) and check H. Pylori titers.            Dizziness R42       Intermittent and on Meclizine PRN - and doing exercises and PT  -            Cerebrovascular accident (CVA) due to embolism of right cerebellar artery (Multi) - Primary I63.441       Still dizzy but improved and on Meclizine it has improved and doing PT              Dyslipidemia E78.5               Primary hypertension I10       Relevant Medications     lisinopril 20 mg tablet     GERD (gastroesophageal reflux disease) K21.9     CVA (cerebrovascular accident due to intracerebral hemorrhage) (CMS/HCC) I61.9       Stroke prevention by adding the Plavix and aspirin and Atorvastatin   and educate extensively tobacco cessation - and control cholesterol and blood pressure and antiplatelet therapy reviewed with the patient the MRA of the brain            Relevant Medications     clopidogrel (Plavix) 75 mg tablet     atorvastatin (Lipitor) 40 mg tablet     Dizziness R42       Intermittent and on Meclizine PRN -            Malaise and fatigue - Primary R53.81, R53.83                           Hypogonadism in male E29.1            Monitor levles           Primary  hypertension - Primary I10          HTN - hypertension well/controlled .Target BP < 130/80  achieved. Educate low salt diet and exercise with weight loss. Educate home self monitoring and diary keeping. Educate risks of elevate blood pressure and benefits of prompt treatment.  Refill lisinopril           GERD (gastroesophageal reflux disease) K21.9          GERD - Acid reflux disease. Rx. PPI (Prilosec/Prevacid/Protonix/Nexium) and educate diet and life style changes. Referred patient to an endoscopy (EGD) and check H. Pylori titers.                HTN - hypertension well/controlled.Target BP < 130/80 well/not achieved. Educate low salt diet and exercise with weight loss. Educate home self monitoring and diary keeping. Educate risks of elevate blood pressure and benefits of prompt treatment..start Lisinopril/Hct 10/12.5 mg daily      Dizziness - monitor blood pressure      Fatigue - check CMP(metabolic panel and elctrolytes) , CBC(complete blood cell count), TSH(thyroid function). Insomnia may play a role and sleep studies(rule out sleep apnea) are recommended. Educate sleep hygiene. Consider anxiety disorder vs. depression. Consider Stress test, and 2DECHO.      GERD - Continues the proton pump inhibitors in (PPI) in the form of /Pantoprazole 40 mg daily and educate change in life style and educate diet - Educate extensively diet and elevate the head of the bed at night - at HS = refer to GI for EGD - last upper endoscopy ??? refer back to GI for upper endoscopy -                 Immunizations/Injections       very important  Immunizations from outside sources need reconciliation.       Pfizer COVID-19 vaccine, bivalent, age 12 years and older (30 mcg/0.3 mL)9/23/2022  Pfizer Purple Cap SARS-CoV-24/12/2021, 3/22/2021                  Immunizations/Injections       very important  Immunizations from outside sources need reconciliation.       Pfizer COVID-19 vaccine, bivalent, age 12 years and older (30 m                  Immunizations/Injections      very important  Immunizations from outside sources need reconciliation.      Pfizer COVID-19 vaccine, bivalent, age 12 years and older (30 mcg/0.3 mL)9/23/2022  Pfizer Purple Cap SARS-CoV-24/12/2021, 3/22/2021

## 2024-09-07 ENCOUNTER — APPOINTMENT (OUTPATIENT)
Dept: RADIOLOGY | Facility: HOSPITAL | Age: 49
End: 2024-09-07
Payer: COMMERCIAL

## 2024-09-21 ENCOUNTER — HOSPITAL ENCOUNTER (OUTPATIENT)
Dept: RADIOLOGY | Facility: HOSPITAL | Age: 49
Discharge: HOME | End: 2024-09-21
Payer: COMMERCIAL

## 2024-09-21 DIAGNOSIS — Z72.0 TOBACCO USE: ICD-10-CM

## 2024-09-21 PROCEDURE — 71271 CT THORAX LUNG CANCER SCR C-: CPT

## 2024-09-21 PROCEDURE — 71271 CT THORAX LUNG CANCER SCR C-: CPT | Performed by: RADIOLOGY

## 2024-09-30 ENCOUNTER — LAB (OUTPATIENT)
Dept: LAB | Facility: LAB | Age: 49
End: 2024-09-30
Payer: COMMERCIAL

## 2024-09-30 DIAGNOSIS — Z00.00 ANNUAL PHYSICAL EXAM: ICD-10-CM

## 2024-09-30 PROCEDURE — 85025 COMPLETE CBC W/AUTO DIFF WBC: CPT

## 2024-09-30 PROCEDURE — 84153 ASSAY OF PSA TOTAL: CPT

## 2024-09-30 PROCEDURE — 80053 COMPREHEN METABOLIC PANEL: CPT

## 2024-09-30 PROCEDURE — 36415 COLL VENOUS BLD VENIPUNCTURE: CPT

## 2024-09-30 PROCEDURE — 80061 LIPID PANEL: CPT

## 2024-09-30 PROCEDURE — 84443 ASSAY THYROID STIM HORMONE: CPT

## 2024-10-01 LAB
ALBUMIN SERPL BCP-MCNC: 4.5 G/DL (ref 3.4–5)
ALP SERPL-CCNC: 85 U/L (ref 33–120)
ALT SERPL W P-5'-P-CCNC: 15 U/L (ref 10–52)
ANION GAP SERPL CALC-SCNC: 16 MMOL/L (ref 10–20)
AST SERPL W P-5'-P-CCNC: 12 U/L (ref 9–39)
BASOPHILS # BLD AUTO: 0.08 X10*3/UL (ref 0–0.1)
BASOPHILS NFR BLD AUTO: 0.8 %
BILIRUB SERPL-MCNC: 0.6 MG/DL (ref 0–1.2)
BUN SERPL-MCNC: 21 MG/DL (ref 6–23)
CALCIUM SERPL-MCNC: 9.7 MG/DL (ref 8.6–10.6)
CHLORIDE SERPL-SCNC: 107 MMOL/L (ref 98–107)
CHOLEST SERPL-MCNC: 110 MG/DL (ref 0–199)
CHOLESTEROL/HDL RATIO: 3.3
CO2 SERPL-SCNC: 24 MMOL/L (ref 21–32)
CREAT SERPL-MCNC: 0.97 MG/DL (ref 0.5–1.3)
EGFRCR SERPLBLD CKD-EPI 2021: >90 ML/MIN/1.73M*2
EOSINOPHIL # BLD AUTO: 0.1 X10*3/UL (ref 0–0.7)
EOSINOPHIL NFR BLD AUTO: 1 %
ERYTHROCYTE [DISTWIDTH] IN BLOOD BY AUTOMATED COUNT: 12.6 % (ref 11.5–14.5)
GLUCOSE SERPL-MCNC: 81 MG/DL (ref 74–99)
HCT VFR BLD AUTO: 46.7 % (ref 41–52)
HDLC SERPL-MCNC: 33.1 MG/DL
HGB BLD-MCNC: 15.7 G/DL (ref 13.5–17.5)
IMM GRANULOCYTES # BLD AUTO: 0.04 X10*3/UL (ref 0–0.7)
IMM GRANULOCYTES NFR BLD AUTO: 0.4 % (ref 0–0.9)
LDLC SERPL CALC-MCNC: 51 MG/DL
LYMPHOCYTES # BLD AUTO: 2.61 X10*3/UL (ref 1.2–4.8)
LYMPHOCYTES NFR BLD AUTO: 27.2 %
MCH RBC QN AUTO: 30.7 PG (ref 26–34)
MCHC RBC AUTO-ENTMCNC: 33.6 G/DL (ref 32–36)
MCV RBC AUTO: 91 FL (ref 80–100)
MONOCYTES # BLD AUTO: 0.61 X10*3/UL (ref 0.1–1)
MONOCYTES NFR BLD AUTO: 6.4 %
NEUTROPHILS # BLD AUTO: 6.16 X10*3/UL (ref 1.2–7.7)
NEUTROPHILS NFR BLD AUTO: 64.2 %
NON HDL CHOLESTEROL: 77 MG/DL (ref 0–149)
NRBC BLD-RTO: 0 /100 WBCS (ref 0–0)
PLATELET # BLD AUTO: 301 X10*3/UL (ref 150–450)
POTASSIUM SERPL-SCNC: 4.7 MMOL/L (ref 3.5–5.3)
PROT SERPL-MCNC: 7.2 G/DL (ref 6.4–8.2)
PSA SERPL-MCNC: 0.95 NG/ML
RBC # BLD AUTO: 5.11 X10*6/UL (ref 4.5–5.9)
SODIUM SERPL-SCNC: 142 MMOL/L (ref 136–145)
TRIGL SERPL-MCNC: 129 MG/DL (ref 0–149)
TSH SERPL-ACNC: 1.82 MIU/L (ref 0.44–3.98)
VLDL: 26 MG/DL (ref 0–40)
WBC # BLD AUTO: 9.6 X10*3/UL (ref 4.4–11.3)

## 2024-10-11 PROBLEM — K44.9 DIAPHRAGMATIC HERNIA WITHOUT OBSTRUCTION OR GANGRENE: Status: ACTIVE | Noted: 2024-10-11

## 2024-10-11 PROBLEM — K57.30 DIVERTICULOSIS OF LARGE INTESTINE WITHOUT PERFORATION OR ABS: Status: ACTIVE | Noted: 2024-10-11

## 2024-10-11 PROBLEM — K22.89 OTHER SPECIFIED DISEASE OF ESOPHAGUS: Status: ACTIVE | Noted: 2024-10-11

## 2024-10-11 PROBLEM — K31.89 OTHER DISEASES OF STOMACH AND DUODENUM: Status: ACTIVE | Noted: 2024-10-11

## 2024-10-11 PROBLEM — K63.5 POLYP OF COLON: Status: ACTIVE | Noted: 2024-10-11

## 2024-10-14 ENCOUNTER — APPOINTMENT (OUTPATIENT)
Dept: RADIOLOGY | Facility: HOSPITAL | Age: 49
End: 2024-10-14
Payer: COMMERCIAL

## 2024-10-14 ENCOUNTER — HOSPITAL ENCOUNTER (EMERGENCY)
Facility: HOSPITAL | Age: 49
Discharge: HOME | End: 2024-10-14
Payer: COMMERCIAL

## 2024-10-14 VITALS
BODY MASS INDEX: 33.04 KG/M2 | SYSTOLIC BLOOD PRESSURE: 154 MMHG | WEIGHT: 218 LBS | TEMPERATURE: 97.7 F | DIASTOLIC BLOOD PRESSURE: 101 MMHG | RESPIRATION RATE: 18 BRPM | HEIGHT: 68 IN | OXYGEN SATURATION: 99 % | HEART RATE: 106 BPM

## 2024-10-14 DIAGNOSIS — S62.668A: Primary | ICD-10-CM

## 2024-10-14 PROCEDURE — 2500000001 HC RX 250 WO HCPCS SELF ADMINISTERED DRUGS (ALT 637 FOR MEDICARE OP): Performed by: PHYSICIAN ASSISTANT

## 2024-10-14 PROCEDURE — 73140 X-RAY EXAM OF FINGER(S): CPT | Mod: RT

## 2024-10-14 PROCEDURE — 90715 TDAP VACCINE 7 YRS/> IM: CPT | Performed by: PHYSICIAN ASSISTANT

## 2024-10-14 PROCEDURE — 2500000004 HC RX 250 GENERAL PHARMACY W/ HCPCS (ALT 636 FOR OP/ED): Performed by: PHYSICIAN ASSISTANT

## 2024-10-14 PROCEDURE — 90471 IMMUNIZATION ADMIN: CPT | Performed by: PHYSICIAN ASSISTANT

## 2024-10-14 PROCEDURE — 99283 EMERGENCY DEPT VISIT LOW MDM: CPT | Mod: 25

## 2024-10-14 PROCEDURE — 73140 X-RAY EXAM OF FINGER(S): CPT | Mod: RIGHT SIDE | Performed by: RADIOLOGY

## 2024-10-14 RX ORDER — CEPHALEXIN 500 MG/1
500 CAPSULE ORAL ONCE
Status: COMPLETED | OUTPATIENT
Start: 2024-10-14 | End: 2024-10-14

## 2024-10-14 RX ORDER — HYDROCODONE BITARTRATE AND ACETAMINOPHEN 5; 325 MG/1; MG/1
1 TABLET ORAL EVERY 6 HOURS PRN
Qty: 12 TABLET | Refills: 0 | Status: SHIPPED | OUTPATIENT
Start: 2024-10-14

## 2024-10-14 RX ORDER — IBUPROFEN 600 MG/1
600 TABLET ORAL EVERY 6 HOURS PRN
Qty: 20 TABLET | Refills: 0 | Status: SHIPPED | OUTPATIENT
Start: 2024-10-14

## 2024-10-14 RX ORDER — CEPHALEXIN 500 MG/1
500 CAPSULE ORAL 4 TIMES DAILY
Qty: 20 CAPSULE | Refills: 0 | Status: SHIPPED | OUTPATIENT
Start: 2024-10-14 | End: 2024-10-19

## 2024-10-14 RX ORDER — IBUPROFEN 600 MG/1
600 TABLET ORAL ONCE
Status: COMPLETED | OUTPATIENT
Start: 2024-10-14 | End: 2024-10-14

## 2024-10-14 RX ADMIN — TETANUS TOXOID, REDUCED DIPHTHERIA TOXOID AND ACELLULAR PERTUSSIS VACCINE, ADSORBED 0.5 ML: 5; 2.5; 8; 8; 2.5 SUSPENSION INTRAMUSCULAR at 18:49

## 2024-10-14 RX ADMIN — IBUPROFEN 600 MG: 600 TABLET, FILM COATED ORAL at 18:49

## 2024-10-14 RX ADMIN — CEPHALEXIN 500 MG: 500 CAPSULE ORAL at 18:49

## 2024-10-14 ASSESSMENT — COLUMBIA-SUICIDE SEVERITY RATING SCALE - C-SSRS
1. IN THE PAST MONTH, HAVE YOU WISHED YOU WERE DEAD OR WISHED YOU COULD GO TO SLEEP AND NOT WAKE UP?: NO
2. HAVE YOU ACTUALLY HAD ANY THOUGHTS OF KILLING YOURSELF?: NO
6. HAVE YOU EVER DONE ANYTHING, STARTED TO DO ANYTHING, OR PREPARED TO DO ANYTHING TO END YOUR LIFE?: NO

## 2024-10-14 ASSESSMENT — PAIN DESCRIPTION - PAIN TYPE: TYPE: ACUTE PAIN

## 2024-10-14 ASSESSMENT — PAIN DESCRIPTION - ORIENTATION: ORIENTATION: RIGHT

## 2024-10-14 ASSESSMENT — PAIN SCALES - GENERAL: PAINLEVEL_OUTOF10: 9

## 2024-10-14 ASSESSMENT — PAIN - FUNCTIONAL ASSESSMENT: PAIN_FUNCTIONAL_ASSESSMENT: 0-10

## 2024-10-14 ASSESSMENT — PAIN DESCRIPTION - LOCATION: LOCATION: FINGER (COMMENT WHICH ONE)

## 2024-10-14 NOTE — Clinical Note
Tyrel Kruger was seen and treated in our emergency department on 10/14/2024.  He may return to work on 10/17/2024.       If you have any questions or concerns, please don't hesitate to call.      Geronimo Canchola PA-C

## 2024-10-14 NOTE — ED PROVIDER NOTES
HPI   Chief Complaint   Patient presents with    Hand Pain       49-year-old male presented emergency department the chief complaint of pain in his distal right second upper extremity digit.  He states it was crushed in the gear he was working on earlier today.  He does have a small skin defect.  He is concerned for underlying fracture.  He is unsure of tetanus status.  Has pain with range of motion of finger.  Denies any other injury or trauma.  Well-appearing nontoxic.  No other complaint.              Patient History   Past Medical History:   Diagnosis Date    Elevated blood-pressure reading, without diagnosis of hypertension 08/26/2021    Elevated BP without diagnosis of hypertension    History of herpes zoster 02/15/2024    Personal history of other infectious and parasitic diseases     History of herpes zoster     Past Surgical History:   Procedure Laterality Date    OTHER SURGICAL HISTORY  08/26/2021    No history of surgery     No family history on file.  Social History     Tobacco Use    Smoking status: Former     Current packs/day: 1.00     Types: Cigarettes    Smokeless tobacco: Never   Substance Use Topics    Alcohol use: Yes     Comment: social    Drug use: Never       Physical Exam   ED Triage Vitals [10/14/24 1624]   Temperature Heart Rate Respirations BP   36.5 °C (97.7 °F) (!) 106 18 (!) 154/101      Pulse Ox Temp Source Heart Rate Source Patient Position   99 % Temporal Monitor;Brachial Sitting      BP Location FiO2 (%)     Left arm --       Physical Exam  Vitals and nursing note reviewed.   Constitutional:       Appearance: Normal appearance.   HENT:      Head: Normocephalic.      Nose: Nose normal.      Mouth/Throat:      Mouth: Mucous membranes are moist.   Cardiovascular:      Rate and Rhythm: Normal rate.      Pulses: Normal pulses.   Pulmonary:      Effort: Pulmonary effort is normal.   Abdominal:      General: Abdomen is flat.   Musculoskeletal:         General: Normal range of motion.       Cervical back: Normal range of motion.   Skin:     General: Skin is warm.   Neurological:      General: No focal deficit present.      Mental Status: He is alert and oriented to person, place, and time.   Psychiatric:         Mood and Affect: Mood normal.           ED Course & MDM   Diagnoses as of 10/14/24 1840   Closed nondisplaced fracture of distal phalanx of index finger, unspecified laterality, initial encounter                 No data recorded     [place] Coma Scale Score: 15 (10/14/24 1624 : Anna Mitchell RN)                           Medical Decision Making  I have seen and evaluated this patient.  Physician available for consultation.  Vital signs have been reviewed.  All laboratory and diagnostic imaging is reviewed by myself and interpreted by myself unless otherwise stated.  Additionally imaging is interpreted by radiologist.    With small skin defect, does not require suture repair.  Patient will be covered for open fracture however I do not believe that this wound goes deep enough to go to the fracture.  Placed on prophylactic antibiotic.  Splinted.  Tetanus updated.  Released in stable condition from emergent standpoint with outpatient hand surgery follow-up.    Labs Reviewed - No data to display  XR fingers right 2+ views   Final Result        Nondisplaced comminuted fracture of the tuft of the right distal   phalanx 2nd digit.        Small metallic foreign body of the head of the 3rd proximal phalanx   of uncertain acuity.        Signed by: Jose Manuel Lima 10/14/2024 6:07 PM   Dictation workstation:   [id number]        Medications   ibuprofen tablet 600 mg (has no administration in time range)   cephalexin (Keflex) capsule 500 mg (has no administration in time range)   diphth,pertus(acell),tetanus (BoostRIX) 2.5-8-5 Lf-mcg-Lf/0.5mL vaccine 0.5 mL (has no administration in time range)     New Prescriptions    CEPHALEXIN (KEFLEX) 500 MG CAPSULE    Take 1 capsule (500 mg) by mouth 4 times a day for 5  days.    HYDROCODONE-ACETAMINOPHEN (NORCO) 5-325 MG TABLET    Take 1 tablet by mouth every 6 hours if needed for severe pain (7 - 10) for up to 12 doses.    IBUPROFEN 600 MG TABLET    Take 1 tablet (600 mg) by mouth every 6 hours if needed for mild pain (1 - 3) for up to 20 doses.         Procedure  Procedures     Geronimo Canchola PA-C  10/14/24 0274

## 2024-10-16 ENCOUNTER — OFFICE VISIT (OUTPATIENT)
Dept: URGENT CARE | Age: 49
End: 2024-10-16
Payer: COMMERCIAL

## 2024-10-16 VITALS
TEMPERATURE: 98.1 F | HEIGHT: 69 IN | BODY MASS INDEX: 32.44 KG/M2 | WEIGHT: 219 LBS | DIASTOLIC BLOOD PRESSURE: 86 MMHG | SYSTOLIC BLOOD PRESSURE: 129 MMHG

## 2024-10-16 DIAGNOSIS — Z76.89 RETURN TO WORK EXAM: Primary | ICD-10-CM

## 2024-10-16 PROCEDURE — 3079F DIAST BP 80-89 MM HG: CPT | Performed by: NURSE PRACTITIONER

## 2024-10-16 PROCEDURE — 99202 OFFICE O/P NEW SF 15 MIN: CPT | Performed by: NURSE PRACTITIONER

## 2024-10-16 PROCEDURE — 3074F SYST BP LT 130 MM HG: CPT | Performed by: NURSE PRACTITIONER

## 2024-10-16 PROCEDURE — 3008F BODY MASS INDEX DOCD: CPT | Performed by: NURSE PRACTITIONER

## 2024-10-16 ASSESSMENT — PAIN SCALES - GENERAL: PAINLEVEL_OUTOF10: 4

## 2024-10-16 NOTE — PATIENT INSTRUCTIONS
Work note with restrictions given to pt  Return on 10-    Please follow up with your primary provider within one week if symptoms do not improve.  You may schedule an appointment online at \[email or web address] or call [phone]. Go to the Emergency Department if symptoms significantly worsen or if you develop chest pain or shortness of breath.

## 2024-10-16 NOTE — PROGRESS NOTES
"Subjective   Patient ID: Tyrel Kruger \"Mele\" is a 49 y.o. male. They present today with a chief complaint of Injury (Patient seen in ED on 10/14 for fracture finger, just needs note for work. ).    History of Present Illness  Tyrel Kruger is a 49 y.o. male who presents to the clinic for work note with restrictions.  Associated symptoms are fractured right index finger on Monday.  Pt went to ER, pt needs work restrictions for his job. . Pt denies any chest pain, sob, N/V at this time in clinic.         Injury      Past Medical History  Allergies as of 10/16/2024    (No Known Allergies)       (Not in a hospital admission)       Past Medical History:   Diagnosis Date    Elevated blood-pressure reading, without diagnosis of hypertension 08/26/2021    Elevated BP without diagnosis of hypertension    History of herpes zoster 02/15/2024    Personal history of other infectious and parasitic diseases     History of herpes zoster       Past Surgical History:   Procedure Laterality Date    OTHER SURGICAL HISTORY  08/26/2021    No history of surgery        reports that he has quit smoking. His smoking use included cigarettes. He has never used smokeless tobacco. He reports current alcohol use. He reports that he does not use drugs.    Review of Systems  Review of Systems   All other systems reviewed and are negative.                                 Objective    Vitals:    10/16/24 0945   BP: 129/86   BP Location: Left arm   Patient Position: Sitting   BP Cuff Size: Adult   Temp: 36.7 °C (98.1 °F)   Weight: 99.3 kg (219 lb)   Height: 1.753 m (5' 9\")     No LMP for male patient.    Physical Exam  Constitutional:       Appearance: Normal appearance.   Eyes:      Extraocular Movements: Extraocular movements intact.      Conjunctiva/sclera: Conjunctivae normal.      Pupils: Pupils are equal, round, and reactive to light.   Neurological:      General: No focal deficit present.      Mental Status: He is alert and oriented to person, " place, and time. Mental status is at baseline.   Psychiatric:         Mood and Affect: Mood normal.         Behavior: Behavior normal.     Right index finger in splint    Procedures    Point of Care Test & Imaging Results from this visit  No results found for this visit on 10/16/24.   XR fingers right 2+ views    Result Date: 10/14/2024  Interpreted By:  Jose Manuel Lima, STUDY: XR FINGERS RIGHT 2+ VIEWS   INDICATION: Signs/Symptoms:r 2nd digit.   COMPARISON: None   ACCESSION NUMBER(S): [id number]   ORDERING CLINICIAN: DEMETRI CARRASQUILLO   FINDINGS: Nondisplaced comminuted fracture of the tuft of the right distal phalanx 2nd digit.   Small metallic foreign body of the head of the 3rd proximal phalanx of uncertain acuity.         Nondisplaced comminuted fracture of the tuft of the right distal phalanx 2nd digit.   Small metallic foreign body of the head of the 3rd proximal phalanx of uncertain acuity.   Signed by: Jose Manuel Lima 10/14/2024 6:07 PM Dictation workstation:   [id number]     Diagnostic study results (if any) were reviewed by ARGELIA Hernandez.    Assessment/Plan   Allergies, medications, history, and pertinent labs/EKGs/Imaging reviewed by ARGELIA Hernandez.     Medical Decision Making  Work note given to pt. If any other issues, please come back to [facility]. Pt verbalized understanding.     Orders and Diagnoses  There are no diagnoses linked to this encounter.    Medical Admin Record      Patient disposition: Home    Please follow up with your primary provider within one week if symptoms do not improve.  You may schedule an appointment online at [email or web address] or call [phone]. Go to the Emergency Department if symptoms significantly worsen or if you develop chest pain or shortness of breath.    Electronically signed by ARGELIA Hernandez  9:59 AM

## 2024-10-16 NOTE — LETTER
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 . Pt should be on light duty for two weeks.    If you have any questions or concerns, please don't hesitate to call.         Sincerely,        Jean Claude Duarte, APRN-CNP    CC: No Recipients

## 2024-10-22 DIAGNOSIS — I61.9 CVA (CEREBROVASCULAR ACCIDENT DUE TO INTRACEREBRAL HEMORRHAGE) (MULTI): ICD-10-CM

## 2024-10-22 DIAGNOSIS — I10 PRIMARY HYPERTENSION: ICD-10-CM

## 2024-10-22 RX ORDER — LISINOPRIL 20 MG/1
20 TABLET ORAL DAILY
Qty: 90 TABLET | Refills: 0 | Status: SHIPPED | OUTPATIENT
Start: 2024-10-22

## 2024-10-22 RX ORDER — ATORVASTATIN CALCIUM 40 MG/1
40 TABLET, FILM COATED ORAL DAILY
Qty: 90 TABLET | Refills: 0 | Status: SHIPPED | OUTPATIENT
Start: 2024-10-22

## 2024-12-10 ENCOUNTER — APPOINTMENT (OUTPATIENT)
Dept: PRIMARY CARE | Facility: CLINIC | Age: 49
End: 2024-12-10
Payer: COMMERCIAL

## 2024-12-10 VITALS
RESPIRATION RATE: 22 BRPM | HEIGHT: 69 IN | DIASTOLIC BLOOD PRESSURE: 80 MMHG | BODY MASS INDEX: 33.03 KG/M2 | WEIGHT: 223 LBS | HEART RATE: 70 BPM | SYSTOLIC BLOOD PRESSURE: 130 MMHG | OXYGEN SATURATION: 100 %

## 2024-12-10 DIAGNOSIS — K21.9 GASTROESOPHAGEAL REFLUX DISEASE WITHOUT ESOPHAGITIS: ICD-10-CM

## 2024-12-10 DIAGNOSIS — Z72.0 TOBACCO USE: ICD-10-CM

## 2024-12-10 DIAGNOSIS — I10 PRIMARY HYPERTENSION: ICD-10-CM

## 2024-12-10 DIAGNOSIS — R53.81 MALAISE AND FATIGUE: ICD-10-CM

## 2024-12-10 DIAGNOSIS — N40.0 BENIGN PROSTATIC HYPERPLASIA WITHOUT LOWER URINARY TRACT SYMPTOMS: ICD-10-CM

## 2024-12-10 DIAGNOSIS — R42 DIZZINESS: ICD-10-CM

## 2024-12-10 DIAGNOSIS — R53.83 MALAISE AND FATIGUE: ICD-10-CM

## 2024-12-10 DIAGNOSIS — E78.5 DYSLIPIDEMIA: ICD-10-CM

## 2024-12-10 DIAGNOSIS — I61.9 CVA (CEREBROVASCULAR ACCIDENT DUE TO INTRACEREBRAL HEMORRHAGE) (MULTI): Primary | ICD-10-CM

## 2024-12-10 PROCEDURE — 3079F DIAST BP 80-89 MM HG: CPT | Performed by: INTERNAL MEDICINE

## 2024-12-10 PROCEDURE — 3008F BODY MASS INDEX DOCD: CPT | Performed by: INTERNAL MEDICINE

## 2024-12-10 PROCEDURE — 99213 OFFICE O/P EST LOW 20 MIN: CPT | Performed by: INTERNAL MEDICINE

## 2024-12-10 PROCEDURE — 3075F SYST BP GE 130 - 139MM HG: CPT | Performed by: INTERNAL MEDICINE

## 2024-12-10 RX ORDER — PANTOPRAZOLE SODIUM 40 MG/1
40 TABLET, DELAYED RELEASE ORAL
Qty: 90 TABLET | Refills: 0 | Status: SHIPPED | OUTPATIENT
Start: 2024-12-10

## 2024-12-10 RX ORDER — LISINOPRIL 20 MG/1
20 TABLET ORAL DAILY
Qty: 90 TABLET | Refills: 0 | Status: SHIPPED | OUTPATIENT
Start: 2024-12-10

## 2024-12-10 RX ORDER — ATORVASTATIN CALCIUM 40 MG/1
40 TABLET, FILM COATED ORAL DAILY
Qty: 90 TABLET | Refills: 0 | Status: SHIPPED | OUTPATIENT
Start: 2024-12-10

## 2024-12-10 ASSESSMENT — ENCOUNTER SYMPTOMS
HEMATOLOGIC/LYMPHATIC NEGATIVE: 1
GASTROINTESTINAL NEGATIVE: 1
EYES NEGATIVE: 1
ENDOCRINE NEGATIVE: 1
NEUROLOGICAL NEGATIVE: 1
CONSTITUTIONAL NEGATIVE: 1
PSYCHIATRIC NEGATIVE: 1
RESPIRATORY NEGATIVE: 1
ALLERGIC/IMMUNOLOGIC NEGATIVE: 1
CARDIOVASCULAR NEGATIVE: 1
MUSCULOSKELETAL NEGATIVE: 1

## 2024-12-10 NOTE — PROGRESS NOTES
"Subjective   Patient ID: Mele Kruger is a 49 y.o. male who presents for Follow-up (3 month follow up).    HPI     Review of Systems   Constitutional: Negative.    HENT: Negative.     Eyes: Negative.    Respiratory: Negative.     Cardiovascular: Negative.    Gastrointestinal: Negative.    Endocrine: Negative.    Musculoskeletal: Negative.    Skin: Negative.    Allergic/Immunologic: Negative.    Neurological: Negative.    Hematological: Negative.    Psychiatric/Behavioral: Negative.     All other systems reviewed and are negative.      Objective   /80   Pulse 70   Resp 22   Ht 1.753 m (5' 9\")   Wt 101 kg (223 lb)   SpO2 100%   BMI 32.93 kg/m²     Physical Exam  Vitals and nursing note reviewed.   Constitutional:       Appearance: Normal appearance.   HENT:      Head: Normocephalic and atraumatic.      Right Ear: Tympanic membrane, ear canal and external ear normal.      Left Ear: Tympanic membrane, ear canal and external ear normal. There is no impacted cerumen.      Nose: Nose normal.      Mouth/Throat:      Mouth: Mucous membranes are moist.      Pharynx: Oropharynx is clear.   Eyes:      Extraocular Movements: Extraocular movements intact.      Conjunctiva/sclera: Conjunctivae normal.      Pupils: Pupils are equal, round, and reactive to light.   Cardiovascular:      Rate and Rhythm: Normal rate and regular rhythm.      Pulses: Normal pulses.      Heart sounds: Normal heart sounds. No murmur heard.  Pulmonary:      Effort: Pulmonary effort is normal. No respiratory distress.      Breath sounds: Normal breath sounds. No stridor. No wheezing, rhonchi or rales.   Chest:      Chest wall: No tenderness.   Abdominal:      General: Abdomen is flat. Bowel sounds are normal. There is no distension.      Palpations: Abdomen is soft. There is no mass.      Tenderness: There is no abdominal tenderness. There is no right CVA tenderness, left CVA tenderness, guarding or rebound.      Hernia: No hernia is present. "   Musculoskeletal:         General: Normal range of motion.      Cervical back: Normal range of motion and neck supple.   Skin:     General: Skin is warm.      Capillary Refill: Capillary refill takes less than 2 seconds.   Neurological:      General: No focal deficit present.      Mental Status: He is alert.      Cranial Nerves: No cranial nerve deficit.      Sensory: No sensory deficit.      Motor: No weakness.      Coordination: Coordination normal.      Gait: Gait normal.      Deep Tendon Reflexes: Reflexes normal.   Psychiatric:         Mood and Affect: Mood normal.         Behavior: Behavior normal. Behavior is cooperative.         Thought Content: Thought content normal.         Judgment: Judgment normal.         Assessment/Plan   Problem List Items Addressed This Visit             ICD-10-CM    Primary hypertension I10     HTN - hypertension well/controlled .Target BP < 130/80 achieved. Educate low salt diet and exercise with weight loss. Educate home self monitoring and diary keeping. Educate risks of elevate blood pressure and benefits of prompt treatment. Refill lisinopril          Relevant Medications    lisinopril 20 mg tablet    GERD (gastroesophageal reflux disease) K21.9     GERD - Acid reflux disease. Rx. PPI (Prilosec/Prevacid/Protonix/Nexium) and educate diet and life style changes. Referred patient to an endoscopy (EGD) and check H. Pylori titers.          Relevant Medications    pantoprazole (ProtoNix) 40 mg EC tablet    CVA (cerebrovascular accident due to intracerebral hemorrhage) (Multi) - Primary I61.9     Stroke prevention with Statin therapy and Aspirin 81 mg daily  and tobacco cessation and control of the blood pressure and education          Relevant Medications    atorvastatin (Lipitor) 40 mg tablet    Other Relevant Orders    CBC and Auto Differential    Comprehensive Metabolic Panel    Dizziness R42     Intermittent and due to cerebellar stroke and on Meclizine PRN - and doing  exercises and PT  -          Malaise and fatigue R53.81, R53.83     Fatigue - check CMP(metabolic panel and elctrolytes) , CBC(complete blood cell count), TSH(thyroid function). Insomnia may play a role and sleep studies(rule out sleep apnea) are recommended . Educate sleep hygiene. Consider anxiety disorder vs. depression. Consider Stress test, and 2DECHO.           Relevant Orders    TSH with reflex to Free T4 if abnormal    Dyslipidemia E78.5     Hypercholesterolemia - Monitor lipid profile and educate patient upon risks of high cholesterol and targets. Educate diet and change in lifestyle and increase in exercises - Refill: Atorvastatin   40 mg daily  and educate compliance with medication and diet.           Relevant Orders    Lipid Panel    Tobacco use Z72.0     Tobacco cessation - Educate risks of smoking (CAD/COPD/CANCER of the lung or urinary bladder/CVA). Educate life style changes and prescribe nicotine patch and Wellbutrin 150mg BID. Educate stress reduction.                                                                                       Other Visit Diagnoses         Codes    Benign prostatic hyperplasia without lower urinary tract symptoms     N40.0    Relevant Orders    Prostate Specific Antigen            Hypogonadism in male E29.1        Monitor Testosterone levels            Primary hypertension I10       HTN - hypertension well/controlled .Target BP < 130/80 achieved. Educate low salt diet and exercise with weight loss. Educate home self monitoring and diary keeping. Educate risks of elevate blood pressure and benefits of prompt treatment. Refill lisinopril            GERD (gastroesophageal reflux disease) K21.9       GERD - Acid reflux disease. Rx. PPI (Prilosec/Prevacid/Protonix/Nexium) and educate diet and life style changes. Referred patient to an endoscopy (EGD) and check H. Pylori titers.            Dyslipidemia E78.5       Hypercholesterolemia - Monitor lipid profile and educate  patient upon risks of high cholesterol and targets. Educate diet and change in lifestyle and increase in exercises - Refill: Atorvastatin   40 mg daily  and educate compliance with medication and diet.             Tobacco use Z72.0       Tobacco cessation - Educate risks of smoking (CAD/COPD/CANCER of the lung or urinary bladder/CVA). Educate life style changes and prescribe nicotine patch and Wellbutrin 150mg BID. Educate stress reduction.                                                                                        Relevant Orders     CT lung screening low dose                                                        Primary hypertension I10         HTN - hypertension well/controlled .Target BP < 130/80  achieved. Educate low salt diet and exercise with weight loss. Educate home self monitoring and diary keeping. Educate risks of elevate blood pressure and benefits of prompt treatment.  Refill lisinopril            GERD (gastroesophageal reflux disease) K21.9       GERD - Acid reflux disease. Rx. PPI (Prilosec/Prevacid/Protonix/Nexium) and educate diet and life style changes. Referred patient to an endoscopy (EGD) and check H. Pylori titers.            Dizziness R42       Intermittent and on Meclizine PRN - and doing exercises and PT  -            Cerebrovascular accident (CVA) due to embolism of right cerebellar artery (Multi) - Primary I63.441       Still dizzy but improved and on Meclizine it has improved and doing PT              Dyslipidemia E78.5                   Primary hypertension I10       Relevant Medications     lisinopril 20 mg tablet     GERD (gastroesophageal reflux disease) K21.9     CVA (cerebrovascular accident due to intracerebral hemorrhage) (CMS/HCC) I61.9       Stroke prevention by adding the Plavix and aspirin and Atorvastatin   and educate extensively tobacco cessation - and control cholesterol and blood pressure and antiplatelet therapy reviewed with the patient the MRA of the  brain            Relevant Medications     clopidogrel (Plavix) 75 mg tablet     atorvastatin (Lipitor) 40 mg tablet     Dizziness R42       Intermittent and on Meclizine PRN -            Malaise and fatigue - Primary R53.81, R53.83                                  Hypogonadism in male E29.1             Monitor levles           Primary hypertension - Primary I10           HTN - hypertension well/controlled .Target BP < 130/80  achieved. Educate low salt diet and exercise with weight loss. Educate home self monitoring and diary keeping. Educate risks of elevate blood pressure and benefits of prompt treatment.  Refill lisinopril           GERD (gastroesophageal reflux disease) K21.9           GERD - Acid reflux disease. Rx. PPI (Prilosec/Prevacid/Protonix/Nexium) and educate diet and life style changes. Referred patient to an endoscopy (EGD) and check H. Pylori titers.                HTN - hypertension well/controlled.Target BP < 130/80 well/not achieved. Educate low salt diet and exercise with weight loss. Educate home self monitoring and diary keeping. Educate risks of elevate blood pressure and benefits of prompt treatment..start Lisinopril/Hct 10/12.5 mg daily      Dizziness - monitor blood pressure      Fatigue - check CMP(metabolic panel and elctrolytes) , CBC(complete blood cell count), TSH(thyroid function). Insomnia may play a role and sleep studies(rule out sleep apnea) are recommended. Educate sleep hygiene. Consider anxiety disorder vs. depression. Consider Stress test, and 2DECHO.      GERD - Continues the proton pump inhibitors in (PPI) in the form of /Pantoprazole 40 mg daily and educate change in life style and educate diet - Educate extensively diet and elevate the head of the bed at night - at HS = refer to GI for EGD - last upper endoscopy ??? refer back to GI for upper endoscopy -                 Immunizations/Injections       very important  Immunizations from outside sources need reconciliation.        Pfizer COVID-19 vaccine, bivalent, age 12 years and older (30 mcg/0.3 mL)9/23/2022  Pfizer Purple Cap SARS-CoV-24/12/2021, 3/22/2021                  Immunizations/Injections       very important  Immunizations from outside sources need reconciliation.       Pfizer COVID-19 vaccine, bivalent, age 12 years and older (30 m                   Immunizations/Injections       very important  Immunizations from outside sources need reconciliation.       Pfizer COVID-19 vaccine, bivalent, age 12 years and older (30 mcg/0.3 mL)9/23/2022  Pfizer Purple Cap SARS-CoV-24/12/2021, 3/22/2021              Immunizations/Injections      very important  Immunizations from outside sources need reconciliation.      COVID-19, mRNA, LNP-S, PF, 30 mcg/0.3 mL dose4/12/2021, 3/22/2021  Pfizer COVID-19 vaccine, bivalent, age 12 years and older (30 mcg/0.3 mL)9/23/2022  Tdap vaccine, age 7 year and older (BOOSTRIX, ADACEL)10/14/2024

## 2024-12-10 NOTE — ASSESSMENT & PLAN NOTE
Stroke prevention with Statin therapy and Aspirin 81 mg daily  and tobacco cessation and control of the blood pressure and education

## 2025-02-20 DIAGNOSIS — K21.9 GASTROESOPHAGEAL REFLUX DISEASE WITHOUT ESOPHAGITIS: ICD-10-CM

## 2025-02-20 RX ORDER — PANTOPRAZOLE SODIUM 40 MG/1
40 TABLET, DELAYED RELEASE ORAL
Qty: 90 TABLET | Refills: 0 | Status: SHIPPED | OUTPATIENT
Start: 2025-02-20

## 2025-02-25 LAB
ALBUMIN SERPL-MCNC: 4.2 G/DL (ref 3.6–5.1)
ALP SERPL-CCNC: 79 U/L (ref 35–144)
ALT SERPL-CCNC: 22 U/L (ref 9–46)
ANION GAP SERPL CALCULATED.4IONS-SCNC: 9 MMOL/L (CALC) (ref 7–17)
AST SERPL-CCNC: 14 U/L (ref 10–35)
BASOPHILS # BLD AUTO: 54 CELLS/UL (ref 0–200)
BASOPHILS NFR BLD AUTO: 0.6 %
BILIRUB SERPL-MCNC: 0.5 MG/DL (ref 0.2–1.2)
BUN SERPL-MCNC: 14 MG/DL (ref 7–25)
CALCIUM SERPL-MCNC: 9 MG/DL (ref 8.6–10.3)
CHLORIDE SERPL-SCNC: 107 MMOL/L (ref 98–110)
CHOLEST SERPL-MCNC: 139 MG/DL
CHOLEST/HDLC SERPL: 4.2 (CALC)
CO2 SERPL-SCNC: 28 MMOL/L (ref 20–32)
CREAT SERPL-MCNC: 0.83 MG/DL (ref 0.7–1.3)
EGFRCR SERPLBLD CKD-EPI 2021: 106 ML/MIN/1.73M2
EOSINOPHIL # BLD AUTO: 162 CELLS/UL (ref 15–500)
EOSINOPHIL NFR BLD AUTO: 1.8 %
ERYTHROCYTE [DISTWIDTH] IN BLOOD BY AUTOMATED COUNT: 12.9 % (ref 11–15)
GLUCOSE SERPL-MCNC: 78 MG/DL (ref 65–99)
HCT VFR BLD AUTO: 45.3 % (ref 38.5–50)
HDLC SERPL-MCNC: 33 MG/DL
HGB BLD-MCNC: 15.2 G/DL (ref 13.2–17.1)
LDLC SERPL CALC-MCNC: 79 MG/DL (CALC)
LYMPHOCYTES # BLD AUTO: 2367 CELLS/UL (ref 850–3900)
LYMPHOCYTES NFR BLD AUTO: 26.3 %
MCH RBC QN AUTO: 30.4 PG (ref 27–33)
MCHC RBC AUTO-ENTMCNC: 33.6 G/DL (ref 32–36)
MCV RBC AUTO: 90.6 FL (ref 80–100)
MONOCYTES # BLD AUTO: 522 CELLS/UL (ref 200–950)
MONOCYTES NFR BLD AUTO: 5.8 %
NEUTROPHILS # BLD AUTO: 5895 CELLS/UL (ref 1500–7800)
NEUTROPHILS NFR BLD AUTO: 65.5 %
NONHDLC SERPL-MCNC: 106 MG/DL (CALC)
PLATELET # BLD AUTO: 364 THOUSAND/UL (ref 140–400)
PMV BLD REES-ECKER: 10.3 FL (ref 7.5–12.5)
POTASSIUM SERPL-SCNC: 4.1 MMOL/L (ref 3.5–5.3)
PROT SERPL-MCNC: 6.6 G/DL (ref 6.1–8.1)
PSA SERPL-MCNC: 0.91 NG/ML
RBC # BLD AUTO: 5 MILLION/UL (ref 4.2–5.8)
SODIUM SERPL-SCNC: 144 MMOL/L (ref 135–146)
TRIGL SERPL-MCNC: 168 MG/DL
TSH SERPL-ACNC: 1.19 MIU/L (ref 0.4–4.5)
WBC # BLD AUTO: 9 THOUSAND/UL (ref 3.8–10.8)

## 2025-03-11 ENCOUNTER — APPOINTMENT (OUTPATIENT)
Dept: PRIMARY CARE | Facility: CLINIC | Age: 50
End: 2025-03-11
Payer: COMMERCIAL

## 2025-03-11 VITALS
RESPIRATION RATE: 22 BRPM | WEIGHT: 220 LBS | HEIGHT: 69 IN | OXYGEN SATURATION: 98 % | BODY MASS INDEX: 32.58 KG/M2 | HEART RATE: 75 BPM | DIASTOLIC BLOOD PRESSURE: 75 MMHG | SYSTOLIC BLOOD PRESSURE: 128 MMHG

## 2025-03-11 DIAGNOSIS — R53.81 MALAISE AND FATIGUE: ICD-10-CM

## 2025-03-11 DIAGNOSIS — I10 PRIMARY HYPERTENSION: Primary | ICD-10-CM

## 2025-03-11 DIAGNOSIS — E29.1 HYPOGONADISM IN MALE: ICD-10-CM

## 2025-03-11 DIAGNOSIS — E78.5 DYSLIPIDEMIA: ICD-10-CM

## 2025-03-11 DIAGNOSIS — R53.83 MALAISE AND FATIGUE: ICD-10-CM

## 2025-03-11 DIAGNOSIS — I63.441 CEREBROVASCULAR ACCIDENT (CVA) DUE TO EMBOLISM OF RIGHT CEREBELLAR ARTERY (MULTI): ICD-10-CM

## 2025-03-11 DIAGNOSIS — K21.9 GASTROESOPHAGEAL REFLUX DISEASE WITHOUT ESOPHAGITIS: ICD-10-CM

## 2025-03-11 DIAGNOSIS — Z72.0 TOBACCO USE: ICD-10-CM

## 2025-03-11 PROCEDURE — 3078F DIAST BP <80 MM HG: CPT | Performed by: INTERNAL MEDICINE

## 2025-03-11 PROCEDURE — 3008F BODY MASS INDEX DOCD: CPT | Performed by: INTERNAL MEDICINE

## 2025-03-11 PROCEDURE — 1036F TOBACCO NON-USER: CPT | Performed by: INTERNAL MEDICINE

## 2025-03-11 PROCEDURE — 3074F SYST BP LT 130 MM HG: CPT | Performed by: INTERNAL MEDICINE

## 2025-03-11 PROCEDURE — 99213 OFFICE O/P EST LOW 20 MIN: CPT | Performed by: INTERNAL MEDICINE

## 2025-03-11 ASSESSMENT — ENCOUNTER SYMPTOMS
ALLERGIC/IMMUNOLOGIC NEGATIVE: 1
PSYCHIATRIC NEGATIVE: 1
CONSTITUTIONAL NEGATIVE: 1
MUSCULOSKELETAL NEGATIVE: 1
GASTROINTESTINAL NEGATIVE: 1
HEMATOLOGIC/LYMPHATIC NEGATIVE: 1
EYES NEGATIVE: 1
CARDIOVASCULAR NEGATIVE: 1
ENDOCRINE NEGATIVE: 1
NEUROLOGICAL NEGATIVE: 1
RESPIRATORY NEGATIVE: 1

## 2025-03-11 NOTE — PROGRESS NOTES
"Subjective   Patient ID: Mele Kruger is a 49 y.o. male who presents for Follow-up (Follow up).    HPI     Review of Systems   Constitutional: Negative.    HENT: Negative.     Eyes: Negative.    Respiratory: Negative.     Cardiovascular: Negative.    Gastrointestinal: Negative.    Endocrine: Negative.    Musculoskeletal: Negative.    Skin: Negative.    Allergic/Immunologic: Negative.    Neurological: Negative.    Hematological: Negative.    Psychiatric/Behavioral: Negative.     All other systems reviewed and are negative.      Objective   /75   Pulse 75   Resp 22   Ht 1.753 m (5' 9\")   Wt 99.8 kg (220 lb)   SpO2 98%   BMI 32.49 kg/m²     Physical Exam  Vitals and nursing note reviewed.   Constitutional:       Appearance: Normal appearance.   HENT:      Head: Normocephalic and atraumatic.      Right Ear: Tympanic membrane, ear canal and external ear normal.      Left Ear: Tympanic membrane, ear canal and external ear normal. There is no impacted cerumen.      Nose: Nose normal.      Mouth/Throat:      Mouth: Mucous membranes are moist.      Pharynx: Oropharynx is clear.   Eyes:      Extraocular Movements: Extraocular movements intact.      Conjunctiva/sclera: Conjunctivae normal.      Pupils: Pupils are equal, round, and reactive to light.   Cardiovascular:      Rate and Rhythm: Normal rate and regular rhythm.      Pulses: Normal pulses.      Heart sounds: Normal heart sounds. No murmur heard.  Pulmonary:      Effort: Pulmonary effort is normal. No respiratory distress.      Breath sounds: Normal breath sounds. No stridor. No wheezing, rhonchi or rales.   Chest:      Chest wall: No tenderness.   Abdominal:      General: Abdomen is flat. Bowel sounds are normal. There is no distension.      Palpations: Abdomen is soft. There is no mass.      Tenderness: There is no abdominal tenderness. There is no right CVA tenderness, left CVA tenderness, guarding or rebound.      Hernia: No hernia is present. "   Musculoskeletal:         General: Normal range of motion.      Cervical back: Normal range of motion and neck supple.   Skin:     General: Skin is warm.      Capillary Refill: Capillary refill takes less than 2 seconds.   Neurological:      General: No focal deficit present.      Mental Status: He is alert.      Cranial Nerves: No cranial nerve deficit.      Sensory: No sensory deficit.      Motor: No weakness.      Coordination: Coordination normal.      Gait: Gait normal.      Deep Tendon Reflexes: Reflexes normal.   Psychiatric:         Mood and Affect: Mood normal.         Behavior: Behavior normal. Behavior is cooperative.         Thought Content: Thought content normal.         Judgment: Judgment normal.         Assessment/Plan   Problem List Items Addressed This Visit             ICD-10-CM    Hypogonadism in male E29.1     Monitor Testosterone levels          Primary hypertension - Primary I10     HTN - hypertension well/controlled .Target BP < 130/80 achieved. Educate low salt diet and exercise with weight loss. Educate home self monitoring and diary keeping. Educate risks of elevate blood pressure and benefits of prompt treatment. Refill lisinopril          GERD (gastroesophageal reflux disease) K21.9     GERD - Acid reflux disease. Rx. PPI (Prilosec/Prevacid/Protonix/Nexium) and educate diet and life style changes. Referred patient to an endoscopy (EGD) and check H. Pylori titers.          Malaise and fatigue R53.81, R53.83     Fatigue - check CMP(metabolic panel and elctrolytes) , CBC(complete blood cell count), TSH(thyroid function). Insomnia may play a role and sleep studies(rule out sleep apnea) are recommended . Educate sleep hygiene. Consider anxiety disorder vs. depression. Consider Stress test, and 2DECHO.           Cerebrovascular accident (CVA) due to embolism of right cerebellar artery (Multi) I63.441     Still dizzy but improved and on Meclizine it has improved and doing PT             Dyslipidemia E78.5     Hypercholesterolemia - Monitor lipid profile and educate patient upon risks of high cholesterol and targets. Educate diet and change in lifestyle and increase in exercises - Refill: Atorvastatin 40 mg daily and educate compliance with medication and diet.          Tobacco use Z72.0     Tobacco cessation - Educate risks of smoking (CAD/COPD/CANCER of the lung or urinary bladder/CVA). Educate life style changes and prescribe nicotine patch and Wellbutrin 150mg BID. Educate stress reduction.                                                                                            Primary hypertension I10        HTN - hypertension well/controlled .Target BP < 130/80 achieved. Educate low salt diet and exercise with weight loss. Educate home self monitoring and diary keeping. Educate risks of elevate blood pressure and benefits of prompt treatment. Refill lisinopril            Relevant Medications     lisinopril 20 mg tablet     GERD (gastroesophageal reflux disease) K21.9       GERD - Acid reflux disease. Rx. PPI (Prilosec/Prevacid/Protonix/Nexium) and educate diet and life style changes. Referred patient to an endoscopy (EGD) and check H. Pylori titers.            Relevant Medications     pantoprazole (ProtoNix) 40 mg EC tablet     CVA (cerebrovascular accident due to intracerebral hemorrhage) (Multi) - Primary I61.9       Stroke prevention with Statin therapy and Aspirin 81 mg daily  and tobacco cessation and control of the blood pressure and education            Relevant Medications     atorvastatin (Lipitor) 40 mg tablet     Other Relevant Orders     CBC and Auto Differential     Comprehensive Metabolic Panel     Dizziness R42       Intermittent and due to cerebellar stroke and on Meclizine PRN - and doing exercises and PT  -            Malaise and fatigue R53.81, R53.83       Fatigue - check CMP(metabolic panel and elctrolytes) , CBC(complete blood cell count), TSH(thyroid function).  Insomnia may play a role and sleep studies(rule out sleep apnea) are recommended . Educate sleep hygiene. Consider anxiety disorder vs. depression. Consider Stress test, and 2DECHO.             Relevant Orders     TSH with reflex to Free T4 if abnormal     Dyslipidemia E78.5       Hypercholesterolemia - Monitor lipid profile and educate patient upon risks of high cholesterol and targets. Educate diet and change in lifestyle and increase in exercises - Refill: Atorvastatin   40 mg daily  and educate compliance with medication and diet.             Relevant Orders     Lipid Panel     Tobacco use Z72.0       Tobacco cessation - Educate risks of smoking (CAD/COPD/CANCER of the lung or urinary bladder/CVA). Educate life style changes and prescribe nicotine patch and Wellbutrin 150mg BID. Educate stress reduction.                                                                                         Other Visit Diagnoses           Codes     Benign prostatic hyperplasia without lower urinary tract symptoms     N40.0     Relevant Orders     Prostate Specific Antigen                      Hypogonadism in male E29.1         Monitor Testosterone levels            Primary hypertension I10       HTN - hypertension well/controlled .Target BP < 130/80 achieved. Educate low salt diet and exercise with weight loss. Educate home self monitoring and diary keeping. Educate risks of elevate blood pressure and benefits of prompt treatment. Refill lisinopril            GERD (gastroesophageal reflux disease) K21.9       GERD - Acid reflux disease. Rx. PPI (Prilosec/Prevacid/Protonix/Nexium) and educate diet and life style changes. Referred patient to an endoscopy (EGD) and check H. Pylori titers.            Dyslipidemia E78.5       Hypercholesterolemia - Monitor lipid profile and educate patient upon risks of high cholesterol and targets. Educate diet and change in lifestyle and increase in exercises - Refill: Atorvastatin   40 mg daily   and educate compliance with medication and diet.             Tobacco use Z72.0       Tobacco cessation - Educate risks of smoking (CAD/COPD/CANCER of the lung or urinary bladder/CVA). Educate life style changes and prescribe nicotine patch and Wellbutrin 150mg BID. Educate stress reduction.                                                                                        Relevant Orders     CT lung screening low dose                                                                     Primary hypertension I10         HTN - hypertension well/controlled .Target BP < 130/80  achieved. Educate low salt diet and exercise with weight loss. Educate home self monitoring and diary keeping. Educate risks of elevate blood pressure and benefits of prompt treatment.  Refill lisinopril            GERD (gastroesophageal reflux disease) K21.9       GERD - Acid reflux disease. Rx. PPI (Prilosec/Prevacid/Protonix/Nexium) and educate diet and life style changes. Referred patient to an endoscopy (EGD) and check H. Pylori titers.            Dizziness R42       Intermittent and on Meclizine PRN - and doing exercises and PT  -            Cerebrovascular accident (CVA) due to embolism of right cerebellar artery (Multi) - Primary I63.441       Still dizzy but improved and on Meclizine it has improved and doing PT              Dyslipidemia E78.5                   Primary hypertension I10       Relevant Medications     lisinopril 20 mg tablet     GERD (gastroesophageal reflux disease) K21.9     CVA (cerebrovascular accident due to intracerebral hemorrhage) (CMS/HCC) I61.9       Stroke prevention by adding the Plavix and aspirin and Atorvastatin   and educate extensively tobacco cessation - and control cholesterol and blood pressure and antiplatelet therapy reviewed with the patient the MRA of the brain            Relevant Medications     clopidogrel (Plavix) 75 mg tablet     atorvastatin (Lipitor) 40 mg tablet     Dizziness R42        Intermittent and on Meclizine PRN -            Malaise and fatigue - Primary R53.81, R53.83                                  Hypogonadism in male E29.1             Monitor levles           Primary hypertension - Primary I10           HTN - hypertension well/controlled .Target BP < 130/80  achieved. Educate low salt diet and exercise with weight loss. Educate home self monitoring and diary keeping. Educate risks of elevate blood pressure and benefits of prompt treatment.  Refill lisinopril           GERD (gastroesophageal reflux disease) K21.9           GERD - Acid reflux disease. Rx. PPI (Prilosec/Prevacid/Protonix/Nexium) and educate diet and life style changes. Referred patient to an endoscopy (EGD) and check H. Pylori titers.                HTN - hypertension well/controlled.Target BP < 130/80 well/not achieved. Educate low salt diet and exercise with weight loss. Educate home self monitoring and diary keeping. Educate risks of elevate blood pressure and benefits of prompt treatment..start Lisinopril/Hct 10/12.5 mg daily      Dizziness - monitor blood pressure      Fatigue - check CMP(metabolic panel and elctrolytes) , CBC(complete blood cell count), TSH(thyroid function). Insomnia may play a role and sleep studies(rule out sleep apnea) are recommended. Educate sleep hygiene. Consider anxiety disorder vs. depression. Consider Stress test, and 2DECHO.      GERD - Continues the proton pump inhibitors in (PPI) in the form of /Pantoprazole 40 mg daily and educate change in life style and educate diet - Educate extensively diet and elevate the head of the bed at night - at HS = refer to GI for EGD - last upper endoscopy ??? refer back to GI for upper endoscopy -                 Immunizations/Injections       very important  Immunizations from outside sources need reconciliation.       Pfizer COVID-19 vaccine, bivalent, age 12 years and older (30 mcg/0.3 mL)9/23/2022  Pfizer Purple Cap SARS-CoV-24/12/2021, 3/22/2021                   Immunizations/Injections       very important  Immunizations from outside sources need reconciliation.       Pfizer COVID-19 vaccine, bivalent, age 12 years and older (30 m                   Immunizations/Injections       very important  Immunizations from outside sources need reconciliation.       Pfizer COVID-19 vaccine, bivalent, age 12 years and older (30 mcg/0.3 mL)9/23/2022  Pfizer Purple Cap SARS-CoV-24/12/2021, 3/22/2021                Immunizations/Injections       very important  Immunizations from outside sources need reconciliation.       COVID-19, mRNA, LNP-S, PF, 30 mcg/0.3 mL dose4/12/2021, 3/22/2021  Pfizer COVID-19 vaccine, bivalent, age 12 years and older (30 mcg/0.3 mL)9/23/2022  Tdap vaccine, age 7 year and older (BOOSTRIX, ADACEL)10/14/2024              Immunizations/Injections      very important  Immunizations from outside sources need reconciliation.      COVID-19, mRNA, LNP-S, PF, 30 mcg/0.3 mL dose4/12/2021, 3/22/2021  Pfizer COVID-19 vaccine, bivalent, age 12 years and older (30 mcg/0.3 mL)9/23/2022  Tdap vaccine, age 7 year and older (BOOSTRIX, ADACEL)10/14/2024

## 2025-04-03 DIAGNOSIS — I10 PRIMARY HYPERTENSION: ICD-10-CM

## 2025-04-03 DIAGNOSIS — I61.9 CVA (CEREBROVASCULAR ACCIDENT DUE TO INTRACEREBRAL HEMORRHAGE) (MULTI): ICD-10-CM

## 2025-04-03 RX ORDER — LISINOPRIL 20 MG/1
20 TABLET ORAL DAILY
Qty: 90 TABLET | Refills: 0 | Status: SHIPPED | OUTPATIENT
Start: 2025-04-03

## 2025-04-03 RX ORDER — ATORVASTATIN CALCIUM 40 MG/1
40 TABLET, FILM COATED ORAL DAILY
Qty: 90 TABLET | Refills: 0 | Status: SHIPPED | OUTPATIENT
Start: 2025-04-03

## 2025-07-02 DIAGNOSIS — I61.9 CVA (CEREBROVASCULAR ACCIDENT DUE TO INTRACEREBRAL HEMORRHAGE) (MULTI): ICD-10-CM

## 2025-07-02 DIAGNOSIS — I10 PRIMARY HYPERTENSION: ICD-10-CM

## 2025-07-03 RX ORDER — ATORVASTATIN CALCIUM 40 MG/1
40 TABLET, FILM COATED ORAL DAILY
Qty: 90 TABLET | Refills: 0 | Status: SHIPPED | OUTPATIENT
Start: 2025-07-03

## 2025-07-03 RX ORDER — LISINOPRIL 20 MG/1
20 TABLET ORAL DAILY
Qty: 90 TABLET | Refills: 0 | Status: SHIPPED | OUTPATIENT
Start: 2025-07-03

## 2025-07-16 ENCOUNTER — OFFICE (OUTPATIENT)
Dept: URBAN - METROPOLITAN AREA CLINIC 26 | Facility: CLINIC | Age: 50
End: 2025-07-16
Payer: COMMERCIAL

## 2025-07-16 VITALS
SYSTOLIC BLOOD PRESSURE: 136 MMHG | DIASTOLIC BLOOD PRESSURE: 88 MMHG | TEMPERATURE: 98.1 F | WEIGHT: 220 LBS | HEART RATE: 69 BPM | HEIGHT: 68 IN

## 2025-07-16 DIAGNOSIS — Z80.0 FAMILY HISTORY OF MALIGNANT NEOPLASM OF DIGESTIVE ORGANS: ICD-10-CM

## 2025-07-16 DIAGNOSIS — K21.9 GASTRO-ESOPHAGEAL REFLUX DISEASE WITHOUT ESOPHAGITIS: ICD-10-CM

## 2025-07-16 DIAGNOSIS — K59.00 CONSTIPATION, UNSPECIFIED: ICD-10-CM

## 2025-07-16 DIAGNOSIS — K25.9 GASTRIC ULCER, UNSPECIFIED AS ACUTE OR CHRONIC, WITHOUT HEMO: ICD-10-CM

## 2025-07-16 PROCEDURE — 99214 OFFICE O/P EST MOD 30 MIN: CPT | Performed by: INTERNAL MEDICINE

## 2025-08-26 ENCOUNTER — OFFICE VISIT (OUTPATIENT)
Dept: PRIMARY CARE | Facility: CLINIC | Age: 50
End: 2025-08-26
Payer: COMMERCIAL

## 2025-08-26 ENCOUNTER — APPOINTMENT (OUTPATIENT)
Dept: PRIMARY CARE | Facility: CLINIC | Age: 50
End: 2025-08-26
Payer: COMMERCIAL

## 2025-08-26 VITALS
WEIGHT: 219 LBS | BODY MASS INDEX: 32.44 KG/M2 | SYSTOLIC BLOOD PRESSURE: 120 MMHG | HEART RATE: 68 BPM | DIASTOLIC BLOOD PRESSURE: 84 MMHG | HEIGHT: 69 IN | OXYGEN SATURATION: 99 %

## 2025-08-26 DIAGNOSIS — I10 PRIMARY HYPERTENSION: ICD-10-CM

## 2025-08-26 DIAGNOSIS — Z00.00 ANNUAL PHYSICAL EXAM: Primary | ICD-10-CM

## 2025-08-26 DIAGNOSIS — Z87.891 PERSONAL HISTORY OF TOBACCO USE, PRESENTING HAZARDS TO HEALTH: ICD-10-CM

## 2025-08-26 DIAGNOSIS — G47.33 OBSTRUCTIVE SLEEP APNEA: ICD-10-CM

## 2025-08-26 DIAGNOSIS — Z86.73 HISTORY OF CVA (CEREBROVASCULAR ACCIDENT): ICD-10-CM

## 2025-08-26 PROCEDURE — 3074F SYST BP LT 130 MM HG: CPT

## 2025-08-26 PROCEDURE — 99396 PREV VISIT EST AGE 40-64: CPT

## 2025-08-26 PROCEDURE — 99214 OFFICE O/P EST MOD 30 MIN: CPT

## 2025-08-26 PROCEDURE — 3008F BODY MASS INDEX DOCD: CPT

## 2025-08-26 PROCEDURE — 3079F DIAST BP 80-89 MM HG: CPT

## 2025-08-26 RX ORDER — LOSARTAN POTASSIUM 50 MG/1
50 TABLET ORAL DAILY
Qty: 90 TABLET | Refills: 1 | Status: SHIPPED | OUTPATIENT
Start: 2025-08-26 | End: 2026-02-22

## 2025-08-26 RX ORDER — OMEPRAZOLE 40 MG/1
40 CAPSULE, DELAYED RELEASE ORAL
COMMUNITY
Start: 2025-07-04

## 2025-08-26 ASSESSMENT — ENCOUNTER SYMPTOMS
NAUSEA: 0
MYALGIAS: 0
POLYPHAGIA: 0
VOMITING: 0
HEMATURIA: 0
BACK PAIN: 0
WHEEZING: 0
DYSURIA: 0
AGITATION: 0
DIZZINESS: 0
LOSS OF SENSATION IN FEET: 0
PALPITATIONS: 0
NUMBNESS: 0
FEVER: 0
ABDOMINAL PAIN: 0
SHORTNESS OF BREATH: 0
CHILLS: 0
OCCASIONAL FEELINGS OF UNSTEADINESS: 0
DEPRESSION: 0
WEAKNESS: 0
POLYDIPSIA: 0
CONSTIPATION: 0
CHEST TIGHTNESS: 0
DIFFICULTY URINATING: 0
DIARRHEA: 0
LIGHT-HEADEDNESS: 0
UNEXPECTED WEIGHT CHANGE: 0

## 2025-08-26 ASSESSMENT — PATIENT HEALTH QUESTIONNAIRE - PHQ9
2. FEELING DOWN, DEPRESSED OR HOPELESS: NOT AT ALL
SUM OF ALL RESPONSES TO PHQ9 QUESTIONS 1 AND 2: 0
1. LITTLE INTEREST OR PLEASURE IN DOING THINGS: NOT AT ALL

## 2025-09-25 ENCOUNTER — APPOINTMENT (OUTPATIENT)
Dept: RADIOLOGY | Facility: HOSPITAL | Age: 50
End: 2025-09-25
Payer: COMMERCIAL

## 2025-12-08 ENCOUNTER — APPOINTMENT (OUTPATIENT)
Dept: PRIMARY CARE | Facility: CLINIC | Age: 50
End: 2025-12-08
Payer: COMMERCIAL

## 2026-03-20 ENCOUNTER — APPOINTMENT (OUTPATIENT)
Dept: SLEEP MEDICINE | Facility: CLINIC | Age: 51
End: 2026-03-20
Payer: COMMERCIAL